# Patient Record
Sex: FEMALE | Race: WHITE | NOT HISPANIC OR LATINO
[De-identification: names, ages, dates, MRNs, and addresses within clinical notes are randomized per-mention and may not be internally consistent; named-entity substitution may affect disease eponyms.]

---

## 2017-01-09 ENCOUNTER — APPOINTMENT (OUTPATIENT)
Dept: OBGYN | Facility: CLINIC | Age: 67
End: 2017-01-09

## 2017-01-09 VITALS
HEIGHT: 65 IN | SYSTOLIC BLOOD PRESSURE: 121 MMHG | WEIGHT: 200 LBS | DIASTOLIC BLOOD PRESSURE: 70 MMHG | BODY MASS INDEX: 33.32 KG/M2

## 2017-01-09 DIAGNOSIS — M19.90 UNSPECIFIED OSTEOARTHRITIS, UNSPECIFIED SITE: ICD-10-CM

## 2017-01-09 LAB
BILIRUB UR QL STRIP: NORMAL
COLLECTION METHOD: NORMAL
GLUCOSE UR-MCNC: NORMAL
HCG UR QL: 0.2 EU/DL
HGB UR QL STRIP.AUTO: ABNORMAL
KETONES UR-MCNC: NORMAL
LEUKOCYTE ESTERASE UR QL STRIP: NORMAL
NITRITE UR QL STRIP: NORMAL
PH UR STRIP: 5.5
PROT UR STRIP-MCNC: NORMAL

## 2017-01-11 LAB — BACTERIA UR CULT: ABNORMAL

## 2017-01-16 LAB — CYTOLOGY CVX/VAG DOC THIN PREP: NORMAL

## 2017-02-22 ENCOUNTER — RX RENEWAL (OUTPATIENT)
Age: 67
End: 2017-02-22

## 2017-06-13 ENCOUNTER — RX RENEWAL (OUTPATIENT)
Age: 67
End: 2017-06-13

## 2017-11-01 ENCOUNTER — OTHER (OUTPATIENT)
Age: 67
End: 2017-11-01

## 2017-12-18 ENCOUNTER — RX RENEWAL (OUTPATIENT)
Age: 67
End: 2017-12-18

## 2018-01-10 ENCOUNTER — APPOINTMENT (OUTPATIENT)
Dept: OBGYN | Facility: CLINIC | Age: 68
End: 2018-01-10
Payer: MEDICARE

## 2018-01-10 VITALS
SYSTOLIC BLOOD PRESSURE: 132 MMHG | BODY MASS INDEX: 36.65 KG/M2 | WEIGHT: 220 LBS | HEIGHT: 65 IN | DIASTOLIC BLOOD PRESSURE: 82 MMHG

## 2018-01-10 DIAGNOSIS — Z00.00 ENCOUNTER FOR GENERAL ADULT MEDICAL EXAMINATION W/OUT ABNORMAL FINDINGS: ICD-10-CM

## 2018-01-10 LAB
BILIRUB UR QL STRIP: NORMAL
COLLECTION METHOD: NORMAL
GLUCOSE UR-MCNC: NORMAL
HCG UR QL: 0.2 EU/DL
HEMOCCULT SP1 STL QL: NEGATIVE
HGB UR QL STRIP.AUTO: ABNORMAL
KETONES UR-MCNC: NORMAL
LEUKOCYTE ESTERASE UR QL STRIP: NORMAL
NITRITE UR QL STRIP: NORMAL
PH UR STRIP: 5
PROT UR STRIP-MCNC: NORMAL
SP GR UR STRIP: 1.02

## 2018-01-10 PROCEDURE — 99214 OFFICE O/P EST MOD 30 MIN: CPT

## 2018-01-10 PROCEDURE — 81003 URINALYSIS AUTO W/O SCOPE: CPT | Mod: QW

## 2018-01-10 PROCEDURE — 82270 OCCULT BLOOD FECES: CPT

## 2018-01-15 LAB
APPEARANCE: CLEAR
BACTERIA UR CULT: NORMAL
BACTERIA: NEGATIVE
BILIRUBIN URINE: NEGATIVE
BLOOD URINE: NEGATIVE
COLOR: YELLOW
GLUCOSE QUALITATIVE U: NEGATIVE MG/DL
KETONES URINE: NEGATIVE
LEUKOCYTE ESTERASE URINE: NEGATIVE
MICROSCOPIC-UA: NORMAL
NITRITE URINE: NEGATIVE
PH URINE: 5
PROTEIN URINE: NEGATIVE MG/DL
RED BLOOD CELLS URINE: 4 /HPF
SPECIFIC GRAVITY URINE: 1.02
SQUAMOUS EPITHELIAL CELLS: 2 /HPF
UROBILINOGEN URINE: NEGATIVE MG/DL
WHITE BLOOD CELLS URINE: 1 /HPF

## 2018-01-16 ENCOUNTER — OTHER (OUTPATIENT)
Age: 68
End: 2018-01-16

## 2018-09-19 ENCOUNTER — RX RENEWAL (OUTPATIENT)
Age: 68
End: 2018-09-19

## 2018-10-15 ENCOUNTER — OTHER (OUTPATIENT)
Age: 68
End: 2018-10-15

## 2018-11-15 ENCOUNTER — APPOINTMENT (OUTPATIENT)
Dept: OBGYN | Facility: CLINIC | Age: 68
End: 2018-11-15
Payer: MEDICARE

## 2018-11-15 ENCOUNTER — ASOB RESULT (OUTPATIENT)
Age: 68
End: 2018-11-15

## 2018-11-15 PROCEDURE — 76857 US EXAM PELVIC LIMITED: CPT

## 2018-11-15 PROCEDURE — 76830 TRANSVAGINAL US NON-OB: CPT

## 2019-01-14 ENCOUNTER — APPOINTMENT (OUTPATIENT)
Dept: OBGYN | Facility: CLINIC | Age: 69
End: 2019-01-14
Payer: MEDICARE

## 2019-01-14 VITALS
HEIGHT: 65 IN | SYSTOLIC BLOOD PRESSURE: 126 MMHG | BODY MASS INDEX: 35.16 KG/M2 | WEIGHT: 211 LBS | DIASTOLIC BLOOD PRESSURE: 77 MMHG

## 2019-01-14 DIAGNOSIS — D25.9 LEIOMYOMA OF UTERUS, UNSPECIFIED: ICD-10-CM

## 2019-01-14 DIAGNOSIS — R31.29 OTHER MICROSCOPIC HEMATURIA: ICD-10-CM

## 2019-01-14 LAB
BILIRUB UR QL STRIP: NORMAL
COLLECTION METHOD: NORMAL
GLUCOSE UR-MCNC: NORMAL
HCG UR QL: 0.2 EU/DL
HEMOCCULT SP1 STL QL: NEGATIVE
HGB UR QL STRIP.AUTO: ABNORMAL
KETONES UR-MCNC: ABNORMAL
LEUKOCYTE ESTERASE UR QL STRIP: NORMAL
NITRITE UR QL STRIP: NORMAL
PH UR STRIP: 5.5
PROT UR STRIP-MCNC: NORMAL
SP GR UR STRIP: 1.02

## 2019-01-14 PROCEDURE — 81003 URINALYSIS AUTO W/O SCOPE: CPT | Mod: QW

## 2019-01-14 PROCEDURE — 82270 OCCULT BLOOD FECES: CPT

## 2019-01-14 PROCEDURE — G0101: CPT

## 2019-01-14 NOTE — PHYSICAL EXAM
[Awake] : awake [Alert] : alert [Soft] : soft [Obese] : obese [Oriented x3] : oriented to person, place, and time [Normal] : uterus [No Bleeding] : there was no active vaginal bleeding [Uterine Adnexae] : were not tender and not enlarged [Nl Sphincter Tone] : normal sphincter tone [External Hemorrhoid] : an external hemorrhoid [Acute Distress] : no acute distress [Mass] : no breast mass [Nipple Discharge] : no nipple discharge [Axillary LAD] : no axillary lymphadenopathy [Tender] : non tender [FreeTextEntry9] : GUAIAC DEFERRED, HAD COLONOSCOPY IN DECEMBER 2016

## 2019-01-14 NOTE — HISTORY OF PRESENT ILLNESS
[1 Year Ago] : 1 year ago [Fair] : being in fair health [Regular Exercise] : regular exercise [Weight Concerns] : weight concens [Overweight] : overweight [Recent Weight Loss (___ Lbs)] : recent [unfilled] ~Ulbs weight loss [Last Mammogram ___] : Last Mammogram was [unfilled] [Last Bone Density ___] : Last bone density studies [unfilled] [Last Colonoscopy ___] : Last colonoscopy [unfilled] [Last Pap ___] : Last cervical pap smear was [unfilled] [Postmenopausal] : is postmenopausal [Definite:  ___ (Date)] : the last menstrual period was [unfilled] [Currently In Menopause] : currently in menopause [Healthy Diet] : not having a healthy diet [de-identified] :  EXERCISES WHILE IN [place], GOING BACK IN 2 WEEKS [FreeTextEntry1] : NEW MEDICATION "SAXENDA" THAT CUTS APPETITE [Hot Flashes] : no hot flashes [Night Sweats] : no night sweats [FreeTextEntry8] : NO SYMPTOMS ON HT, GETS MIGRAINES AND INSOMNIA WHEN OFF HT [Experiencing Menopausal Sxs] : not experiencing menopausal symptoms [Sexually Active] : is not sexually active

## 2019-01-17 LAB
APPEARANCE: CLEAR
BACTERIA UR CULT: ABNORMAL
BACTERIA: NEGATIVE
BILIRUBIN URINE: NEGATIVE
BLOOD URINE: NEGATIVE
COLOR: YELLOW
GLUCOSE QUALITATIVE U: NEGATIVE MG/DL
HYALINE CASTS: 6 /LPF
KETONES URINE: NEGATIVE
LEUKOCYTE ESTERASE URINE: NEGATIVE
MICROSCOPIC-UA: NORMAL
NITRITE URINE: NEGATIVE
PH URINE: 6
PROTEIN URINE: NEGATIVE MG/DL
RED BLOOD CELLS URINE: 6 /HPF
SPECIFIC GRAVITY URINE: 1.02
SQUAMOUS EPITHELIAL CELLS: 4 /HPF
UROBILINOGEN URINE: NEGATIVE MG/DL
WHITE BLOOD CELLS URINE: 2 /HPF

## 2019-02-12 ENCOUNTER — APPOINTMENT (OUTPATIENT)
Dept: OBGYN | Facility: CLINIC | Age: 69
End: 2019-02-12
Payer: MEDICARE

## 2019-02-12 PROCEDURE — 77085 DXA BONE DENSITY AXL VRT FX: CPT

## 2019-02-19 ENCOUNTER — OTHER (OUTPATIENT)
Age: 69
End: 2019-02-19

## 2019-06-03 ENCOUNTER — APPOINTMENT (OUTPATIENT)
Dept: OBGYN | Facility: CLINIC | Age: 69
End: 2019-06-03
Payer: MEDICARE

## 2019-06-03 ENCOUNTER — ASOB RESULT (OUTPATIENT)
Age: 69
End: 2019-06-03

## 2019-06-03 PROCEDURE — 76830 TRANSVAGINAL US NON-OB: CPT

## 2019-08-15 ENCOUNTER — ASOB RESULT (OUTPATIENT)
Age: 69
End: 2019-08-15

## 2019-08-15 ENCOUNTER — APPOINTMENT (OUTPATIENT)
Dept: OBGYN | Facility: CLINIC | Age: 69
End: 2019-08-15
Payer: MEDICARE

## 2019-08-15 DIAGNOSIS — N84.0 POLYP OF CORPUS UTERI: ICD-10-CM

## 2019-08-15 PROCEDURE — 76831 ECHO EXAM UTERUS: CPT

## 2019-08-15 PROCEDURE — 58100 BIOPSY OF UTERUS LINING: CPT

## 2019-08-15 PROCEDURE — 58340 CATHETER FOR HYSTEROGRAPHY: CPT

## 2019-08-15 NOTE — PROCEDURE
[Endometrial Biopsy] : Endometrial biopsy [Post-Menop. Bleeding] : post-menopausal bleeding [Risks] : risks [Benefits] : benefits [Alternatives] : alternatives [Patient] : patient [Infection] : infection [Bleeding] : bleeding [Allergic Reaction] : allergic reaction [Pain] : pain [CONSENT OBTAINED] : written consent was obtained prior to the procedure. [LMP ___] : LMP was [unfilled] [No Premedication] : No premedication [None] : none [Betadine] : Betadine [Easy Passage] : allowed easy passage of a uterine sound without dilation [Sounded to ___ cm] : sounded to [unfilled] ~Ucm [Moderate] : a moderate [Pipelle] : a Pipelle endometrial suction curette [Sent to Histology] : the specimen was place in buffered formalin and sent for pathlogy [Tolerated Well] : the patient tolerated the procedure well [No Complications] : there were no complications [de-identified] : FOLLOWING SIS, ENDOMETRIAL BIOPSY WAS PERFORMED

## 2019-08-15 NOTE — ASSESSMENT
[FreeTextEntry1] : Risks and benefits of surgery were discussed with patient extensively, including but not limited to bleeding, infection and uterine perforation.  The patient expressed her understanding, her questions were answered and consent obtained.\par FOR OPERATIVE HYSTEROSCOPY, POLYPECTOMY, D&C OF UTERUS.

## 2019-08-21 LAB — CORE LAB BIOPSY: NORMAL

## 2019-09-19 ENCOUNTER — APPOINTMENT (OUTPATIENT)
Dept: OBGYN | Facility: CLINIC | Age: 69
End: 2019-09-19

## 2019-10-07 ENCOUNTER — OUTPATIENT (OUTPATIENT)
Dept: OUTPATIENT SERVICES | Facility: HOSPITAL | Age: 69
LOS: 1 days | End: 2019-10-07
Payer: MEDICARE

## 2019-10-07 VITALS
HEIGHT: 65 IN | SYSTOLIC BLOOD PRESSURE: 124 MMHG | OXYGEN SATURATION: 99 % | DIASTOLIC BLOOD PRESSURE: 68 MMHG | HEART RATE: 70 BPM | TEMPERATURE: 98 F | RESPIRATION RATE: 16 BRPM | WEIGHT: 186.07 LBS

## 2019-10-07 DIAGNOSIS — Z01.818 ENCOUNTER FOR OTHER PREPROCEDURAL EXAMINATION: ICD-10-CM

## 2019-10-07 DIAGNOSIS — N95.0 POSTMENOPAUSAL BLEEDING: ICD-10-CM

## 2019-10-07 DIAGNOSIS — Z92.241 PERSONAL HISTORY OF SYSTEMIC STEROID THERAPY: Chronic | ICD-10-CM

## 2019-10-07 DIAGNOSIS — Z98.890 OTHER SPECIFIED POSTPROCEDURAL STATES: Chronic | ICD-10-CM

## 2019-10-07 DIAGNOSIS — Z98.49 CATARACT EXTRACTION STATUS, UNSPECIFIED EYE: Chronic | ICD-10-CM

## 2019-10-07 DIAGNOSIS — Z96.612 PRESENCE OF LEFT ARTIFICIAL SHOULDER JOINT: Chronic | ICD-10-CM

## 2019-10-07 DIAGNOSIS — Z96.651 PRESENCE OF RIGHT ARTIFICIAL KNEE JOINT: Chronic | ICD-10-CM

## 2019-10-07 LAB
ANION GAP SERPL CALC-SCNC: 6 MMOL/L — SIGNIFICANT CHANGE UP (ref 5–17)
BASOPHILS # BLD AUTO: 0.07 K/UL — SIGNIFICANT CHANGE UP (ref 0–0.2)
BASOPHILS NFR BLD AUTO: 0.9 % — SIGNIFICANT CHANGE UP (ref 0–2)
BUN SERPL-MCNC: 18 MG/DL — SIGNIFICANT CHANGE UP (ref 7–23)
CALCIUM SERPL-MCNC: 9.4 MG/DL — SIGNIFICANT CHANGE UP (ref 8.5–10.1)
CHLORIDE SERPL-SCNC: 105 MMOL/L — SIGNIFICANT CHANGE UP (ref 96–108)
CO2 SERPL-SCNC: 29 MMOL/L — SIGNIFICANT CHANGE UP (ref 22–31)
CREAT SERPL-MCNC: 0.84 MG/DL — SIGNIFICANT CHANGE UP (ref 0.5–1.3)
EOSINOPHIL # BLD AUTO: 0.2 K/UL — SIGNIFICANT CHANGE UP (ref 0–0.5)
EOSINOPHIL NFR BLD AUTO: 2.5 % — SIGNIFICANT CHANGE UP (ref 0–6)
GLUCOSE SERPL-MCNC: 83 MG/DL — SIGNIFICANT CHANGE UP (ref 70–99)
HCT VFR BLD CALC: 38.6 % — SIGNIFICANT CHANGE UP (ref 34.5–45)
HCV AB S/CO SERPL IA: 0.12 S/CO — SIGNIFICANT CHANGE UP (ref 0–0.99)
HCV AB SERPL-IMP: SIGNIFICANT CHANGE UP
HGB BLD-MCNC: 12.3 G/DL — SIGNIFICANT CHANGE UP (ref 11.5–15.5)
IMM GRANULOCYTES NFR BLD AUTO: 0.4 % — SIGNIFICANT CHANGE UP (ref 0–1.5)
LYMPHOCYTES # BLD AUTO: 2.16 K/UL — SIGNIFICANT CHANGE UP (ref 1–3.3)
LYMPHOCYTES # BLD AUTO: 26.9 % — SIGNIFICANT CHANGE UP (ref 13–44)
MCHC RBC-ENTMCNC: 27.6 PG — SIGNIFICANT CHANGE UP (ref 27–34)
MCHC RBC-ENTMCNC: 31.9 GM/DL — LOW (ref 32–36)
MCV RBC AUTO: 86.5 FL — SIGNIFICANT CHANGE UP (ref 80–100)
MONOCYTES # BLD AUTO: 0.71 K/UL — SIGNIFICANT CHANGE UP (ref 0–0.9)
MONOCYTES NFR BLD AUTO: 8.8 % — SIGNIFICANT CHANGE UP (ref 2–14)
NEUTROPHILS # BLD AUTO: 4.87 K/UL — SIGNIFICANT CHANGE UP (ref 1.8–7.4)
NEUTROPHILS NFR BLD AUTO: 60.5 % — SIGNIFICANT CHANGE UP (ref 43–77)
PLATELET # BLD AUTO: 294 K/UL — SIGNIFICANT CHANGE UP (ref 150–400)
POTASSIUM SERPL-MCNC: 3.9 MMOL/L — SIGNIFICANT CHANGE UP (ref 3.5–5.3)
POTASSIUM SERPL-SCNC: 3.9 MMOL/L — SIGNIFICANT CHANGE UP (ref 3.5–5.3)
RBC # BLD: 4.46 M/UL — SIGNIFICANT CHANGE UP (ref 3.8–5.2)
RBC # FLD: 12.7 % — SIGNIFICANT CHANGE UP (ref 10.3–14.5)
SODIUM SERPL-SCNC: 140 MMOL/L — SIGNIFICANT CHANGE UP (ref 135–145)
WBC # BLD: 8.04 K/UL — SIGNIFICANT CHANGE UP (ref 3.8–10.5)
WBC # FLD AUTO: 8.04 K/UL — SIGNIFICANT CHANGE UP (ref 3.8–10.5)

## 2019-10-07 PROCEDURE — 93010 ELECTROCARDIOGRAM REPORT: CPT

## 2019-10-07 PROCEDURE — 86850 RBC ANTIBODY SCREEN: CPT

## 2019-10-07 PROCEDURE — 85025 COMPLETE CBC W/AUTO DIFF WBC: CPT

## 2019-10-07 PROCEDURE — 86900 BLOOD TYPING SEROLOGIC ABO: CPT

## 2019-10-07 PROCEDURE — G0463: CPT | Mod: 25

## 2019-10-07 PROCEDURE — 36415 COLL VENOUS BLD VENIPUNCTURE: CPT

## 2019-10-07 PROCEDURE — 93005 ELECTROCARDIOGRAM TRACING: CPT

## 2019-10-07 PROCEDURE — 86901 BLOOD TYPING SEROLOGIC RH(D): CPT

## 2019-10-07 PROCEDURE — 86803 HEPATITIS C AB TEST: CPT

## 2019-10-07 PROCEDURE — 80048 BASIC METABOLIC PNL TOTAL CA: CPT

## 2019-10-07 NOTE — H&P PST ADULT - NSICDXPASTSURGICALHX_GEN_ALL_CORE_FT
PAST SURGICAL HISTORY:  H/O hand surgery with implants;  multiple    H/O total shoulder replacement, left 1/2017    S/P carpal tunnel release bilateral    S/P cataract surgery bilateral IOL    S/P epidural steroid injection     S/P TKR (total knee replacement), right 3/2018

## 2019-10-07 NOTE — H&P PST ADULT - NSICDXFAMILYHX_GEN_ALL_CORE_FT
FAMILY HISTORY:  Family history of Hodgkin's lymphoma, sister  FH: bladder cancer, mother  FH: hypertension, mother  FH: lung cancer, father

## 2019-10-07 NOTE — H&P PST ADULT - ASSESSMENT
69 year old female presents to PST for planned D&C hysteroscopy and polypectomy    Plan:  1. PST instructions given ; NPO post midnight   2. Medical Optimization  with Dr Pedro Frye   3. Stop NSAIDS ( Aspirin Alev Motrin Mobic Diclofenac), herbal supplements , MVI , Vitamin fish oil 7 days prior to surgery  unless directed by surgeon or cardiologist;   4. Labs EKG CXR as per surgeon request

## 2019-10-07 NOTE — H&P PST ADULT - HISTORY OF PRESENT ILLNESS
69 year old female with post menopausal bleeding and polyp; Pt said she had bleeding episode last week of September; denies bloating, abdominal pain cramping and change in bowel habits ; she presents to PST for planned D&C hysteroscopy and polypectomy

## 2019-10-07 NOTE — H&P PST ADULT - NSICDXPASTMEDICALHX_GEN_ALL_CORE_FT
PAST MEDICAL HISTORY:  Depression     GERD (gastroesophageal reflux disease)     Herpes zoster     Hypertension     Lumbar herniated disc     OA (osteoarthritis)     VAUGHN (obstructive sleep apnea) pt says she feels better after she lost 40 lbs ; unable to tolerate cpap and not using dental appliance    Spinal stenosis cervical and lumbar PAST MEDICAL HISTORY:  Depression     GERD (gastroesophageal reflux disease)     Herpes zoster     Hypertension     Lumbar herniated disc     OA (osteoarthritis)     VAUGHN (obstructive sleep apnea) pt says she feels better after she lost 40 lbs ; unable to tolerate cpap and not using dental appliance    Spinal stenosis cervical and lumbar    Varicose veins lower extremi

## 2019-10-08 DIAGNOSIS — N95.0 POSTMENOPAUSAL BLEEDING: ICD-10-CM

## 2019-10-08 DIAGNOSIS — Z01.818 ENCOUNTER FOR OTHER PREPROCEDURAL EXAMINATION: ICD-10-CM

## 2019-10-22 RX ORDER — ONDANSETRON 8 MG/1
4 TABLET, FILM COATED ORAL ONCE
Refills: 0 | Status: DISCONTINUED | OUTPATIENT
Start: 2019-10-23 | End: 2019-10-23

## 2019-10-22 RX ORDER — FENTANYL CITRATE 50 UG/ML
50 INJECTION INTRAVENOUS
Refills: 0 | Status: DISCONTINUED | OUTPATIENT
Start: 2019-10-23 | End: 2019-10-23

## 2019-10-22 RX ORDER — SODIUM CHLORIDE 9 MG/ML
1000 INJECTION, SOLUTION INTRAVENOUS
Refills: 0 | Status: DISCONTINUED | OUTPATIENT
Start: 2019-10-23 | End: 2019-10-23

## 2019-10-22 RX ORDER — SODIUM CHLORIDE 9 MG/ML
3 INJECTION INTRAMUSCULAR; INTRAVENOUS; SUBCUTANEOUS EVERY 8 HOURS
Refills: 0 | Status: DISCONTINUED | OUTPATIENT
Start: 2019-10-23 | End: 2019-10-23

## 2019-10-23 ENCOUNTER — OUTPATIENT (OUTPATIENT)
Dept: INPATIENT UNIT | Facility: HOSPITAL | Age: 69
LOS: 1 days | Discharge: HOME | End: 2019-10-23
Payer: MEDICARE

## 2019-10-23 ENCOUNTER — RESULT REVIEW (OUTPATIENT)
Age: 69
End: 2019-10-23

## 2019-10-23 VITALS
TEMPERATURE: 98 F | HEART RATE: 81 BPM | SYSTOLIC BLOOD PRESSURE: 111 MMHG | RESPIRATION RATE: 16 BRPM | WEIGHT: 186.07 LBS | OXYGEN SATURATION: 98 % | DIASTOLIC BLOOD PRESSURE: 66 MMHG | HEIGHT: 65 IN

## 2019-10-23 VITALS
OXYGEN SATURATION: 98 % | RESPIRATION RATE: 18 BRPM | HEART RATE: 89 BPM | SYSTOLIC BLOOD PRESSURE: 114 MMHG | DIASTOLIC BLOOD PRESSURE: 59 MMHG | TEMPERATURE: 98 F

## 2019-10-23 DIAGNOSIS — Z96.612 PRESENCE OF LEFT ARTIFICIAL SHOULDER JOINT: Chronic | ICD-10-CM

## 2019-10-23 DIAGNOSIS — Z98.890 OTHER SPECIFIED POSTPROCEDURAL STATES: Chronic | ICD-10-CM

## 2019-10-23 DIAGNOSIS — Z96.651 PRESENCE OF RIGHT ARTIFICIAL KNEE JOINT: Chronic | ICD-10-CM

## 2019-10-23 DIAGNOSIS — Z98.49 CATARACT EXTRACTION STATUS, UNSPECIFIED EYE: Chronic | ICD-10-CM

## 2019-10-23 DIAGNOSIS — N84.0 POLYP OF CORPUS UTERI: ICD-10-CM

## 2019-10-23 DIAGNOSIS — Z92.241 PERSONAL HISTORY OF SYSTEMIC STEROID THERAPY: Chronic | ICD-10-CM

## 2019-10-23 DIAGNOSIS — N95.0 POSTMENOPAUSAL BLEEDING: ICD-10-CM

## 2019-10-23 PROCEDURE — 88305 TISSUE EXAM BY PATHOLOGIST: CPT

## 2019-10-23 PROCEDURE — 58561 HYSTEROSCOPY REMOVE MYOMA: CPT

## 2019-10-23 PROCEDURE — 88305 TISSUE EXAM BY PATHOLOGIST: CPT | Mod: 26

## 2019-10-23 RX ORDER — OXYCODONE HYDROCHLORIDE 5 MG/1
5 TABLET ORAL ONCE
Refills: 0 | Status: DISCONTINUED | OUTPATIENT
Start: 2019-10-23 | End: 2019-10-23

## 2019-10-23 RX ORDER — FAMOTIDINE 10 MG/ML
20 INJECTION INTRAVENOUS ONCE
Refills: 0 | Status: COMPLETED | OUTPATIENT
Start: 2019-10-23 | End: 2019-10-23

## 2019-10-23 RX ORDER — ACETAMINOPHEN 500 MG
975 TABLET ORAL ONCE
Refills: 0 | Status: COMPLETED | OUTPATIENT
Start: 2019-10-23 | End: 2019-10-23

## 2019-10-23 RX ADMIN — FENTANYL CITRATE 50 MICROGRAM(S): 50 INJECTION INTRAVENOUS at 11:30

## 2019-10-23 RX ADMIN — FENTANYL CITRATE 50 MICROGRAM(S): 50 INJECTION INTRAVENOUS at 11:18

## 2019-10-23 RX ADMIN — FENTANYL CITRATE 50 MICROGRAM(S): 50 INJECTION INTRAVENOUS at 11:45

## 2019-10-23 RX ADMIN — OXYCODONE HYDROCHLORIDE 5 MILLIGRAM(S): 5 TABLET ORAL at 11:18

## 2019-10-23 RX ADMIN — OXYCODONE HYDROCHLORIDE 5 MILLIGRAM(S): 5 TABLET ORAL at 11:45

## 2019-10-23 RX ADMIN — Medication 975 MILLIGRAM(S): at 09:48

## 2019-10-23 RX ADMIN — Medication 975 MILLIGRAM(S): at 09:49

## 2019-10-23 RX ADMIN — FAMOTIDINE 20 MILLIGRAM(S): 10 INJECTION INTRAVENOUS at 09:48

## 2019-10-23 NOTE — ASU PATIENT PROFILE, ADULT - PMH
Depression    GERD (gastroesophageal reflux disease)    Herpes zoster    Hypertension    Lumbar herniated disc    OA (osteoarthritis)    VAUGHN (obstructive sleep apnea)  pt says she feels better after she lost 40 lbs ; unable to tolerate cpap and not using dental appliance  Spinal stenosis  cervical and lumbar  Varicose veins  lower extremi

## 2019-10-23 NOTE — ASU PATIENT PROFILE, ADULT - PSH
H/O hand surgery  with implants;  multiple  H/O total shoulder replacement, left  1/2017  S/P carpal tunnel release  bilateral  S/P cataract surgery  bilateral IOL  S/P epidural steroid injection    S/P TKR (total knee replacement), right  3/2018

## 2019-10-23 NOTE — ASU DISCHARGE PLAN (ADULT/PEDIATRIC) - CARE PROVIDER_API CALL
Coy Grijalva)  Obstetrics and Gynecology  [address] [id number]  Phone: [phone]  Fax: [phone]  Follow Up Time:

## 2019-10-23 NOTE — BRIEF OPERATIVE NOTE - OPERATION/FINDINGS
Stenotic cervix, normal sized uterus with prominent left uterine horn.  Small polyps at posterior uterine wall.  Large posterior lesion in left horn, resected using myosure resecting hysteroscope, polyp vs. myoma, pending pathology.  No separate endometrial curetting performed due to uterine shape.  NS used 3000 cc, 500 cc deficit.

## 2019-10-23 NOTE — ASU DISCHARGE PLAN (ADULT/PEDIATRIC) - CALL YOUR DOCTOR IF YOU HAVE ANY OF THE FOLLOWING:
Unable to urinate/Inability to tolerate liquids or foods/Increased irritability or sluggishness/Nausea and vomiting that does not stop

## 2019-10-23 NOTE — BRIEF OPERATIVE NOTE - NSICDXBRIEFPREOP_GEN_ALL_CORE_FT
PRE-OP DIAGNOSIS:  Uterine polyp 23-Oct-2019 11:16:50  Coy Grijalva  Postmenopausal bleeding 23-Oct-2019 11:16:39  Coy Grijalva

## 2019-10-30 DIAGNOSIS — M51.26 OTHER INTERVERTEBRAL DISC DISPLACEMENT, LUMBAR REGION: ICD-10-CM

## 2019-10-30 DIAGNOSIS — N88.2 STRICTURE AND STENOSIS OF CERVIX UTERI: ICD-10-CM

## 2019-10-30 DIAGNOSIS — N84.0 POLYP OF CORPUS UTERI: ICD-10-CM

## 2019-10-30 DIAGNOSIS — M19.90 UNSPECIFIED OSTEOARTHRITIS, UNSPECIFIED SITE: ICD-10-CM

## 2019-10-30 DIAGNOSIS — N95.0 POSTMENOPAUSAL BLEEDING: ICD-10-CM

## 2019-10-30 DIAGNOSIS — Z96.651 PRESENCE OF RIGHT ARTIFICIAL KNEE JOINT: ICD-10-CM

## 2019-10-30 DIAGNOSIS — K21.9 GASTRO-ESOPHAGEAL REFLUX DISEASE WITHOUT ESOPHAGITIS: ICD-10-CM

## 2019-10-30 DIAGNOSIS — Z96.612 PRESENCE OF LEFT ARTIFICIAL SHOULDER JOINT: ICD-10-CM

## 2019-10-30 DIAGNOSIS — M81.0 AGE-RELATED OSTEOPOROSIS WITHOUT CURRENT PATHOLOGICAL FRACTURE: ICD-10-CM

## 2019-10-30 DIAGNOSIS — E78.5 HYPERLIPIDEMIA, UNSPECIFIED: ICD-10-CM

## 2019-10-30 DIAGNOSIS — F32.9 MAJOR DEPRESSIVE DISORDER, SINGLE EPISODE, UNSPECIFIED: ICD-10-CM

## 2019-10-30 DIAGNOSIS — I10 ESSENTIAL (PRIMARY) HYPERTENSION: ICD-10-CM

## 2019-10-30 DIAGNOSIS — G47.33 OBSTRUCTIVE SLEEP APNEA (ADULT) (PEDIATRIC): ICD-10-CM

## 2019-11-06 ENCOUNTER — APPOINTMENT (OUTPATIENT)
Dept: OBGYN | Facility: CLINIC | Age: 69
End: 2019-11-06
Payer: MEDICARE

## 2019-11-06 VITALS
WEIGHT: 211 LBS | HEIGHT: 65 IN | BODY MASS INDEX: 35.16 KG/M2 | DIASTOLIC BLOOD PRESSURE: 66 MMHG | SYSTOLIC BLOOD PRESSURE: 122 MMHG

## 2019-11-06 DIAGNOSIS — N95.0 POSTMENOPAUSAL BLEEDING: ICD-10-CM

## 2019-11-06 PROCEDURE — 99212 OFFICE O/P EST SF 10 MIN: CPT

## 2019-11-06 NOTE — HISTORY OF PRESENT ILLNESS
[0/10] : no pain reported [Normal] : the vagina was normal [Doing Well] : is doing well [Excellent Pain Control] : has excellent pain control [No Sign of Infection] : is showing no signs of infection [None] : None [Fever] : no fever [Chills] : no chills [Nausea] : no nausea [Vomiting] : no vomiting [Vaginal Bleeding] : no vaginal bleeding [de-identified] : PATHOLOGY REPORT REVIEWED, MYOMA RESECTED, NORMAL ENDOMETRIUM, PROMINENT LEFT UTERINE HORN [de-identified] : ABDOMEN SOFT, NT, NO CMT, FUNDUS NT. [de-identified] : S/P RESECTION OF MYOMA [de-identified] : DUE FOR ANNUAL 1/2020

## 2019-11-07 PROBLEM — I10 ESSENTIAL (PRIMARY) HYPERTENSION: Chronic | Status: ACTIVE | Noted: 2019-10-07

## 2019-11-07 PROBLEM — M51.26 OTHER INTERVERTEBRAL DISC DISPLACEMENT, LUMBAR REGION: Chronic | Status: ACTIVE | Noted: 2019-10-07

## 2019-11-07 PROBLEM — I83.90 ASYMPTOMATIC VARICOSE VEINS OF UNSPECIFIED LOWER EXTREMITY: Chronic | Status: ACTIVE | Noted: 2019-10-07

## 2019-11-07 PROBLEM — F32.9 MAJOR DEPRESSIVE DISORDER, SINGLE EPISODE, UNSPECIFIED: Chronic | Status: ACTIVE | Noted: 2019-10-07

## 2020-01-29 ENCOUNTER — APPOINTMENT (OUTPATIENT)
Dept: OBGYN | Facility: CLINIC | Age: 70
End: 2020-01-29
Payer: MEDICARE

## 2020-01-29 VITALS
HEIGHT: 65 IN | BODY MASS INDEX: 30.32 KG/M2 | SYSTOLIC BLOOD PRESSURE: 130 MMHG | WEIGHT: 182 LBS | DIASTOLIC BLOOD PRESSURE: 82 MMHG

## 2020-01-29 DIAGNOSIS — Z01.419 ENCOUNTER FOR GYNECOLOGICAL EXAMINATION (GENERAL) (ROUTINE) W/OUT ABNORMAL FINDINGS: ICD-10-CM

## 2020-01-29 LAB
BILIRUB UR QL STRIP: NORMAL
COLLECTION METHOD: NORMAL
GLUCOSE UR-MCNC: NORMAL
HCG UR QL: 0.2 EU/DL
HGB UR QL STRIP.AUTO: NORMAL
KETONES UR-MCNC: NORMAL
LEUKOCYTE ESTERASE UR QL STRIP: NORMAL
NITRITE UR QL STRIP: NORMAL
PH UR STRIP: 6
PROT UR STRIP-MCNC: NORMAL
SP GR UR STRIP: 1.03

## 2020-01-29 PROCEDURE — 99214 OFFICE O/P EST MOD 30 MIN: CPT

## 2020-01-29 PROCEDURE — 81003 URINALYSIS AUTO W/O SCOPE: CPT | Mod: QW

## 2020-01-29 RX ORDER — SULFAMETHOXAZOLE AND TRIMETHOPRIM 800; 160 MG/1; MG/1
800-160 TABLET ORAL TWICE DAILY
Qty: 10 | Refills: 0 | Status: DISCONTINUED | COMMUNITY
Start: 2019-01-18 | End: 2020-01-29

## 2020-01-29 RX ORDER — LOSARTAN POTASSIUM 100 MG/1
TABLET, FILM COATED ORAL
Refills: 0 | Status: ACTIVE | COMMUNITY

## 2020-01-29 RX ORDER — NORETHINDRONE ACETATE AND ETHINYL ESTRADIOL .005; 1 MG/1; MG/1
1-5 TABLET, FILM COATED ORAL
Qty: 84 | Refills: 2 | Status: DISCONTINUED | COMMUNITY
Start: 2019-02-15 | End: 2020-01-29

## 2020-01-29 RX ORDER — DICLOFENAC SODIUM 100 MG/1
TABLET, FILM COATED, EXTENDED RELEASE ORAL
Refills: 0 | Status: DISCONTINUED | COMMUNITY
End: 2020-01-29

## 2020-01-29 RX ORDER — CELECOXIB 50 MG/1
CAPSULE ORAL
Refills: 0 | Status: ACTIVE | COMMUNITY

## 2020-01-29 RX ORDER — DULOXETINE HYDROCHLORIDE 60 MG/1
60 CAPSULE, DELAYED RELEASE ORAL
Refills: 0 | Status: ACTIVE | COMMUNITY

## 2020-01-29 NOTE — PHYSICAL EXAM
[Alert] : alert [Awake] : awake [Soft] : soft [Obese] : obese [Oriented x3] : oriented to person, place, and time [Normal] : cervix [No Bleeding] : there was no active vaginal bleeding [Uterine Adnexae] : were not tender and not enlarged [Mass] : no breast mass [Acute Distress] : no acute distress [Nipple Discharge] : no nipple discharge [Tender] : non tender [Axillary LAD] : no axillary lymphadenopathy [FreeTextEntry9] : GUAIAC DEFERRED, HAD COLONOSCOPY IN DECEMBER 2019 [Exam Deferred] : was deferred

## 2020-01-29 NOTE — HISTORY OF PRESENT ILLNESS
[1 Year Ago] : 1 year ago [Weight Concerns] : weight concens [Overweight] : overweight [Last Mammogram ___] : Last Mammogram was [unfilled] [Last Bone Density ___] : Last bone density studies [unfilled] [Last Pap ___] : Last cervical pap smear was [unfilled] [Postmenopausal] : is postmenopausal [Definite:  ___ (Date)] : the last menstrual period was [unfilled] [Currently In Menopause] : currently in menopause [Good] : being in good health [Recent Weight Loss (___ Lbs)] : recent [unfilled] ~Ulbs weight loss [Regular Exercise] : not exercising regularly [Healthy Diet] : not having a healthy diet [de-identified] : weight loss on Saxenda, 42 pounds [Last Colonoscopy ___] : Last colonoscopy [unfilled] [FreeTextEntry1] :  "SAXENDA" THAT CUTS APPETITE [Hot Flashes] : no hot flashes [FreeTextEntry8] : NO SYMPTOMS ON HT, GETS MIGRAINES AND INSOMNIA WHEN OFF HT [Night Sweats] : no night sweats [Experiencing Menopausal Sxs] : not experiencing menopausal symptoms [Sexually Active] : is not sexually active

## 2020-06-12 DIAGNOSIS — Z01.818 ENCOUNTER FOR OTHER PREPROCEDURAL EXAMINATION: ICD-10-CM

## 2020-06-13 ENCOUNTER — APPOINTMENT (OUTPATIENT)
Dept: DISASTER EMERGENCY | Facility: CLINIC | Age: 70
End: 2020-06-13

## 2020-06-13 LAB — SARS-COV-2 N GENE NPH QL NAA+PROBE: NOT DETECTED

## 2020-12-21 ENCOUNTER — RX RENEWAL (OUTPATIENT)
Age: 70
End: 2020-12-21

## 2020-12-23 PROBLEM — Z01.419 ENCOUNTER FOR GYNECOLOGICAL EXAMINATION WITHOUT ABNORMAL FINDING: Status: RESOLVED | Noted: 2017-01-09 | Resolved: 2020-12-23

## 2021-03-11 ENCOUNTER — APPOINTMENT (OUTPATIENT)
Dept: OBGYN | Facility: CLINIC | Age: 71
End: 2021-03-11
Payer: MEDICARE

## 2021-03-11 VITALS
DIASTOLIC BLOOD PRESSURE: 70 MMHG | TEMPERATURE: 97.3 F | WEIGHT: 195 LBS | SYSTOLIC BLOOD PRESSURE: 130 MMHG | HEIGHT: 65 IN | BODY MASS INDEX: 32.49 KG/M2

## 2021-03-11 DIAGNOSIS — Z01.419 ENCOUNTER FOR GYNECOLOGICAL EXAMINATION (GENERAL) (ROUTINE) W/OUT ABNORMAL FINDINGS: ICD-10-CM

## 2021-03-11 LAB — HEMOCCULT SP1 STL QL: NEGATIVE

## 2021-03-11 PROCEDURE — G0101: CPT

## 2021-03-11 PROCEDURE — 82270 OCCULT BLOOD FECES: CPT

## 2021-03-11 RX ORDER — NORETHINDRONE ACETATE AND ETHINYL ESTRADIOL .5; .0025 MG/1; MG/1
0.5-2.5 TABLET ORAL DAILY
Qty: 84 | Refills: 3 | Status: ACTIVE | COMMUNITY
Start: 2020-12-21 | End: 1900-01-01

## 2021-03-11 NOTE — COUNSELING
[Nutrition/ Exercise/ Weight Management] : nutrition, exercise, weight management [Breast Self Exam] : breast self exam [FreeTextEntry2] : bone health maintenance

## 2021-03-11 NOTE — REVIEW OF SYSTEMS
[Patient Intake Form Reviewed] : Patient intake form was reviewed [Negative] : Heme/Lymph [Dizziness] : dizziness [FreeTextEntry9] : joint pain [de-identified] : sweats

## 2021-03-11 NOTE — HISTORY OF PRESENT ILLNESS
[Patient reported bone density results were normal] : Patient reported bone density results were normal [postmenopausal] : postmenopausal [Patient reported mammogram was normal] : Patient reported mammogram was normal [Patient reported PAP Smear was normal] : Patient reported PAP Smear was normal [Patient reported colonoscopy was normal] : Patient reported colonoscopy was normal [TextBox_4] :  72 YO FEMALE,HERE FOR ANNUAL EXAM  Pt waiting to be vaccinated   had first vaccine\par HER LAST ANNUAL EXAM WAS:1/29/20\par \par PREGNANCY HISTORY:  TOTAL 1     LIVE BIRTHS:   1   SAB:      IAB:\par \par SEXUALLY ACTIVE: no\par LENGTH OF TIME IN RELATIONSHIP: \par \par MEDICAL HISTORY INCLUDES:  htn, OA, bone loss   10/2019 D&C benign stroma\par FAMILY HISTORY IS SIGNIFICANT FOR: breast cancer CVD\par \par LAST VISIT WITH PRIMARY DOCTOR: Feb 2021\par LAST BLOOD WORK:Feb 2021\par \par NUTRITIONAL INFO: Healthy eater, sees weight loss doctor   2 daily\par CALCIUM INTAKE:SUPPLEMENTS:\par CORRECT USE OF CALCIUM SUPPLEMENTS WAS REVIEWED\par \par EXERCISES: severe arthritis through body  \par  [Mammogramdate] : 12/9/19 [PapSmeardate] : 1/9/17 [BoneDensityDate] : 2/12/19 [TextBox_37] : loss at spine  [ColonoscopyDate] : 2020 [LMPDate] : 2007

## 2021-03-11 NOTE — PLAN
[FreeTextEntry1] : Pt  has fu with primary for blood and urine  in 3 months, and recent ua neg   Defer ua today\par Has no uti symptoms

## 2021-03-18 LAB — CYTOLOGY CVX/VAG DOC THIN PREP: NORMAL

## 2021-04-28 ENCOUNTER — APPOINTMENT (OUTPATIENT)
Dept: PULMONOLOGY | Facility: CLINIC | Age: 71
End: 2021-04-28
Payer: MEDICARE

## 2021-04-28 VITALS
SYSTOLIC BLOOD PRESSURE: 148 MMHG | DIASTOLIC BLOOD PRESSURE: 92 MMHG | HEART RATE: 91 BPM | OXYGEN SATURATION: 97 % | WEIGHT: 188 LBS | TEMPERATURE: 97.8 F | RESPIRATION RATE: 16 BRPM | BODY MASS INDEX: 31.32 KG/M2 | HEIGHT: 65 IN

## 2021-04-28 PROCEDURE — 99204 OFFICE O/P NEW MOD 45 MIN: CPT | Mod: GC

## 2021-04-29 NOTE — REVIEW OF SYSTEMS
[EDS: ESS=____] : daytime somnolence: ESS=[unfilled] [Recent Wt Loss (___ Lbs)] : recent [unfilled] ~Ulb weight loss [Nasal Congestion] : nasal congestion [A.M. Dry Mouth] : a.m. dry mouth [Obesity] : obesity [A.M. Headache] : headache present upon awakening [Arthralgias] : arthralgias [Nocturia] : nocturia [Depression] : depression [Anxious] : anxious [Difficulty Maintaining Sleep] : difficulty maintaining sleep [Fatigue] : no fatigue [Postnasal Drip] : no postnasal drip [Witnessed Apneas] : no witnessed apnea [Shortness Of Breath] : no shortness of breath [Orthopnea] : no orthopnea [Chest Pain] : no chest pain [Palpitations] : no palpitations [Edema] : ~T edema was not present [CHF] : no congestive heart failure [Thyroid Disease] : no thyroid disease [Diabetes] : no diabetes  [Anemia] : no anemia [Leg Dysesthesias] : no leg dysesthesias [Cataplexy] :  no cataplexy [Heartburn] : no heartburn [Difficulty Initiating Sleep] : no difficulty falling asleep [Lower Extremity Discomfort] : no lower extremity discomfort [Late day/ Evening symptoms] : no late day/evening symptoms [Unusual Sleep Behavior] : no unusual sleep behavior [Hypersomnolence] : not sleeping much more than usual [Sleep Paralysis] : no sleep paralysis

## 2021-04-29 NOTE — HISTORY OF PRESENT ILLNESS
[Obstructive Sleep Apnea] : obstructive sleep apnea [Frequent Nocturnal Awakening] : frequent nocturnal awakening [Awakes with Headache] : headache upon awakening [Awakening With Dry Mouth] : awakening with dry mouth [Daytime Somnolence] : daytime somnolence [DMS] : DMS [To Bed: ___] : ~he/she~ goes to bed at [unfilled] [Arises: ___] : arises at [unfilled] [Sleep Onset Latency: ___ minutes] : sleep onset latency of [unfilled] minutes reported [Nocturnal Awakenings: ___] : ~he/she~ typically has [unfilled] nocturnal awakenings [TST: ___] : Total sleep time is [unfilled] [Daytime Sleep: ___] : daytime sleep: [unfilled] [Date: ___] : Date of most recent diagnostic polysomnogram: [unfilled] [AHI: ___ per hour] : Apnea-hypopnea index:  [unfilled] per hour [FreeTextEntry1] : 70 yo female with history of VAUGHN, OA presents to the clinic for evaluation for Inspire. She states that she had a sleep study in 3/2016 at [facility] that showed severe sleep apnea. AHI at that time was 55.3. She was started on CPAP therapy but could not tolerate the mask or nasal pillows due claustrophobia. She states that when she gave birth to her daughter she had to have an emergency  and at that time required NIPPV, which traumatized her. She has also tried a oral appliance but states it is uncomfortable and gross. She originally was evaluated for Inspire at [facility] but she was too obese with a BMI around 40. She even had a DISE study which she states kept her qualified. Since that time she has been started on Saxenda and metformin and has lost about 42 pounds. Her sleep apnea symptoms however have improved since that time as well. Her main complaint now in nocturia where she gets up about 5 times per night. She also complains of AM headaches almost everyday and a dry mouth. Her daytime sleepiness is minimal. She is not sure is she snores.  [Witnessed Apneas] : no witnessed sleep apnea [Unintentional Sleep while Active] : no unintentional sleep while active [Unintentional Sleep While Inactive] : no unintentional sleep while inactive [Awakes Unrefreshed] : does not awake unrefreshed [Recent  Weight Gain] : no recent weight gain [DIS] : no DIS [Unusual Sleep Behavior] : no unusual sleep behavior [Hypersomnolence] : no hypersomnolence [Cataplexy] : no cataplexy [Lower Extremity Discomfort] : no lower extremity discomfort in evening or at bedtime [Nocturnal Oxygen] : The patient does not use nocturnal oxygen [ESS] : 4

## 2021-04-29 NOTE — PHYSICAL EXAM
[General Appearance - Well Developed] : well developed [Normal Appearance] : normal appearance [General Appearance - Well Nourished] : well nourished [No Deformities] : no deformities [Normal Conjunctiva] : the conjunctiva exhibited no abnormalities [Eyelids - No Xanthelasma] : the eyelids demonstrated no xanthelasmas [Low Lying Soft Palate] : low lying soft palate [Enlarged Base of the Tongue] : enlargement of the base of the tongue [IV] : IV [Neck Appearance] : the appearance of the neck was normal [Jugular Venous Distention Increased] : there was no jugular-venous distention [Apical Impulse] : the apical impulse was normal [Heart Rate And Rhythm] : heart rate was normal and rhythm regular [Heart Sounds] : normal S1 and S2 [Heart Sounds Gallop] : no gallops [Respiration, Rhythm And Depth] : normal respiratory rhythm and effort [Exaggerated Use Of Accessory Muscles For Inspiration] : no accessory muscle use [Auscultation Breath Sounds / Voice Sounds] : lungs were clear to auscultation bilaterally [Abnormal Walk] : normal gait [Involuntary Movements] : no involuntary movements were seen [Musculoskeletal - Swelling] : no joint swelling seen [Motor Tone] : muscle strength and tone were normal [Nail Clubbing] : no clubbing of the fingernails [Cyanosis, Localized] : no localized cyanosis [Petechial Hemorrhages (___cm)] : no petechial hemorrhages [Skin Color & Pigmentation] : normal skin color and pigmentation [Skin Lesions] : no skin lesions [] : no rash [Motor Exam] : the motor exam was normal [No Focal Deficits] : no focal deficits [Oriented To Time, Place, And Person] : oriented to person, place, and time [Impaired Insight] : insight and judgment were intact [Affect] : the affect was normal [Mood] : the mood was normal [Elongated Uvula] : no elongated uvula [Erythema] : no erythema of the pharynx

## 2021-04-29 NOTE — ASSESSMENT
[FreeTextEntry1] : 70 yo female with history of VAUGHN and OA presents to the clinic for evaluation for Inspire. She previously was evaluated in 2016 for an AHI of 55 and failed CPAP and oral appliance therapy. However at that time her BMI was >40. Since then she has lost 42 lbs and her BMI is now 31.  She has verbalized intolerance to CPAP and oral appliance therapy in the past.  However at this time she continues to have sleep apnea related symptomatology and understands the need to pursue therapy.  Because of the large decrease in weight since her last assessment objective documentation of the current degree of sleep disordered breathing is necessary for treatment planning.  Based on the results of this study we can then further advise as the next therapeutic options.  She is interested in pursuing inspire if she qualifies.  The ramifications of this therapeutic modality as well as its risks and benefits were fully discussed with the patient.\par \par Plan:\par -will arrange for HSAT\par -will send to ENT, Dr. Kent for evaluation for Inspire\par -educated to not drive or use heavy machinery while drowsy

## 2021-06-01 ENCOUNTER — APPOINTMENT (OUTPATIENT)
Dept: SLEEP CENTER | Facility: CLINIC | Age: 71
End: 2021-06-01
Payer: MEDICARE

## 2021-06-01 ENCOUNTER — OUTPATIENT (OUTPATIENT)
Dept: OUTPATIENT SERVICES | Facility: HOSPITAL | Age: 71
LOS: 1 days | End: 2021-06-01
Payer: MEDICARE

## 2021-06-01 DIAGNOSIS — Z98.890 OTHER SPECIFIED POSTPROCEDURAL STATES: Chronic | ICD-10-CM

## 2021-06-01 DIAGNOSIS — Z96.651 PRESENCE OF RIGHT ARTIFICIAL KNEE JOINT: Chronic | ICD-10-CM

## 2021-06-01 DIAGNOSIS — Z98.49 CATARACT EXTRACTION STATUS, UNSPECIFIED EYE: Chronic | ICD-10-CM

## 2021-06-01 DIAGNOSIS — Z96.612 PRESENCE OF LEFT ARTIFICIAL SHOULDER JOINT: Chronic | ICD-10-CM

## 2021-06-01 DIAGNOSIS — Z92.241 PERSONAL HISTORY OF SYSTEMIC STEROID THERAPY: Chronic | ICD-10-CM

## 2021-06-01 PROCEDURE — 95806 SLEEP STUDY UNATT&RESP EFFT: CPT | Mod: 26

## 2021-06-01 PROCEDURE — 95806 SLEEP STUDY UNATT&RESP EFFT: CPT

## 2021-06-07 ENCOUNTER — NON-APPOINTMENT (OUTPATIENT)
Age: 71
End: 2021-06-07

## 2021-06-07 DIAGNOSIS — G47.33 OBSTRUCTIVE SLEEP APNEA (ADULT) (PEDIATRIC): ICD-10-CM

## 2021-07-09 ENCOUNTER — NON-APPOINTMENT (OUTPATIENT)
Age: 71
End: 2021-07-09

## 2021-07-21 ENCOUNTER — APPOINTMENT (OUTPATIENT)
Dept: OTOLARYNGOLOGY | Facility: CLINIC | Age: 71
End: 2021-07-21
Payer: MEDICARE

## 2021-07-21 VITALS
DIASTOLIC BLOOD PRESSURE: 86 MMHG | BODY MASS INDEX: 31.16 KG/M2 | HEIGHT: 65 IN | HEART RATE: 86 BPM | SYSTOLIC BLOOD PRESSURE: 138 MMHG | WEIGHT: 187 LBS

## 2021-07-21 DIAGNOSIS — Z82.49 FAMILY HISTORY OF ISCHEMIC HEART DISEASE AND OTHER DISEASES OF THE CIRCULATORY SYSTEM: ICD-10-CM

## 2021-07-21 DIAGNOSIS — I10 ESSENTIAL (PRIMARY) HYPERTENSION: ICD-10-CM

## 2021-07-21 DIAGNOSIS — J34.2 DEVIATED NASAL SEPTUM: ICD-10-CM

## 2021-07-21 DIAGNOSIS — R23.2 FLUSHING: ICD-10-CM

## 2021-07-21 DIAGNOSIS — Z80.7 FAMILY HISTORY OF OTHER MALIGNANT NEOPLASMS OF LYMPHOID, HEMATOPOIETIC AND RELATED TISSUES: ICD-10-CM

## 2021-07-21 DIAGNOSIS — Z91.09 OTHER ALLERGY STATUS, OTHER THAN TO DRUGS AND BIOLOGICAL SUBSTANCES: ICD-10-CM

## 2021-07-21 DIAGNOSIS — E78.00 PURE HYPERCHOLESTEROLEMIA, UNSPECIFIED: ICD-10-CM

## 2021-07-21 DIAGNOSIS — Z80.3 FAMILY HISTORY OF MALIGNANT NEOPLASM OF BREAST: ICD-10-CM

## 2021-07-21 DIAGNOSIS — Z80.1 FAMILY HISTORY OF MALIGNANT NEOPLASM OF TRACHEA, BRONCHUS AND LUNG: ICD-10-CM

## 2021-07-21 DIAGNOSIS — Z78.9 OTHER SPECIFIED HEALTH STATUS: ICD-10-CM

## 2021-07-21 PROCEDURE — 31575 DIAGNOSTIC LARYNGOSCOPY: CPT

## 2021-07-21 PROCEDURE — 99204 OFFICE O/P NEW MOD 45 MIN: CPT | Mod: 25

## 2021-07-21 RX ORDER — ROSUVASTATIN CALCIUM 5 MG/1
TABLET, FILM COATED ORAL
Refills: 0 | Status: ACTIVE | COMMUNITY

## 2021-07-21 RX ORDER — METFORMIN HYDROCHLORIDE 625 MG/1
TABLET ORAL
Refills: 0 | Status: ACTIVE | COMMUNITY

## 2021-07-21 NOTE — HISTORY OF PRESENT ILLNESS
[de-identified] : 71 year old female referred by Dr Andres for sleep apnea. Patient had sleep study conducted on 6/1/2021 showing an LELE of 50.9  with central and mixed apneas less than 25% of the total events. Patient has a BMI of 31.12. Patient has tried a CPAP machine without relief. Complains of removing mask during sleep. Patient has tried various types of masks without relief. States mask cause claustrophobia, skin irritation, dyspnea, dry mouth. States has tried using a dental appliance without relief. Patient has sleeping partner who complains about the noise of CPAP machine, stating it keeps them awake at night. Patient states tried losing weight, approximately  40 lbs. in the past 3 years with no relief from sleep apnea. Patient comorbidities include weight gain, hypertension, daytime fatigue, difficulty driving long distances due to fatigue.\par \par \par

## 2021-07-21 NOTE — PHYSICAL EXAM
[Midline] : trachea located in midline position [Normal] : no rashes [FreeTextEntry1] : Obesity, VAUGHN

## 2021-07-21 NOTE — PROCEDURE
[Image(s) Captured] : image(s) captured and filed [Video Captured] : video captured and filed [Unable to Cooperate with Mirror] : patient unable to cooperate with mirror [Obstruction] : acute airway obstruction evaluation [Complicated Symptoms] : complicated symptoms requiring more thorough examination than provided by mirror [Topical Lidocaine] : topical lidocaine [Oxymetazoline HCl] : oxymetazoline HCl [Flexible Endoscope] : examined with the flexible endoscope [Serial Number: ___] : Serial Number: [unfilled] [Velopharynx ___ %] : the velopharynx was [unfilled]U% collapsed [Normal] : the false vocal folds were pink and regular, the ventricular sulcus was open, the true vocal folds were glistening white, tense and of equal length, mobility, and height [True Vocal Cords Paralysis] : no true vocal cord paralysis [True Vocal Cords Erythematous] : no true vocal cord edema [True Vocal Cords Barton's Nodules] : no true vocal cord nodules [Glottis Arytenoid Cartilages] : no arytenoid granulomas [Glottis Arytenoid Cartilages Erythema] : no arytenoid erythema [Arytenoid Edema ___ /4] : bilaterally were normal [Arytenoid Erythema ___ /4] : bilaterally were normal [de-identified] : Patient was placed in the examination chair in a sitting position. The nose was decongested with oxymetazoline nasal solution. The airway was anesthetized with 4% Xylocaine.  The back of the throat was anesthetized with Cetacaine. Direct flexible/rigid video endoscopy was performed. Findings revealed:\par severely deviated nasal septum completely obstructing the nasal fossa turbinate hypertrophy nasopharynx a positive Müller maneuver. Epiglottis larynx normal [de-identified] : VAUGHN

## 2021-07-21 NOTE — CONSULT LETTER
[Dear  ___] : Dear  [unfilled], [Consult Letter:] : I had the pleasure of evaluating your patient, [unfilled]. [Please see my note below.] : Please see my note below. [Consult Closing:] : Thank you very much for allowing me to participate in the care of this patient.  If you have any questions, please do not hesitate to contact me. [Sincerely,] : Sincerely, [FreeTextEntry3] : Von Kent MD, LORENA, FACS\par  Department Otolaryngology\par Director of [facility]\par Professor of Otolaryngology, \par [employer]\par

## 2021-07-21 NOTE — REASON FOR VISIT
[Initial Evaluation] : an initial evaluation for [FreeTextEntry2] : referred by Dr Andres for sleep apnea

## 2021-09-10 ENCOUNTER — OUTPATIENT (OUTPATIENT)
Dept: OUTPATIENT SERVICES | Facility: HOSPITAL | Age: 71
LOS: 1 days | End: 2021-09-10
Payer: MEDICARE

## 2021-09-10 VITALS
OXYGEN SATURATION: 98 % | WEIGHT: 188.94 LBS | TEMPERATURE: 97 F | DIASTOLIC BLOOD PRESSURE: 75 MMHG | SYSTOLIC BLOOD PRESSURE: 118 MMHG | HEART RATE: 77 BPM | HEIGHT: 63.5 IN | RESPIRATION RATE: 16 BRPM

## 2021-09-10 DIAGNOSIS — G47.33 OBSTRUCTIVE SLEEP APNEA (ADULT) (PEDIATRIC): ICD-10-CM

## 2021-09-10 DIAGNOSIS — Z98.890 OTHER SPECIFIED POSTPROCEDURAL STATES: Chronic | ICD-10-CM

## 2021-09-10 DIAGNOSIS — J34.2 DEVIATED NASAL SEPTUM: ICD-10-CM

## 2021-09-10 DIAGNOSIS — M19.90 UNSPECIFIED OSTEOARTHRITIS, UNSPECIFIED SITE: ICD-10-CM

## 2021-09-10 DIAGNOSIS — Z92.241 PERSONAL HISTORY OF SYSTEMIC STEROID THERAPY: Chronic | ICD-10-CM

## 2021-09-10 DIAGNOSIS — Z98.49 CATARACT EXTRACTION STATUS, UNSPECIFIED EYE: Chronic | ICD-10-CM

## 2021-09-10 DIAGNOSIS — Z96.612 PRESENCE OF LEFT ARTIFICIAL SHOULDER JOINT: Chronic | ICD-10-CM

## 2021-09-10 DIAGNOSIS — I10 ESSENTIAL (PRIMARY) HYPERTENSION: ICD-10-CM

## 2021-09-10 DIAGNOSIS — R63.4 ABNORMAL WEIGHT LOSS: ICD-10-CM

## 2021-09-10 DIAGNOSIS — J34.3 HYPERTROPHY OF NASAL TURBINATES: ICD-10-CM

## 2021-09-10 DIAGNOSIS — K21.9 GASTRO-ESOPHAGEAL REFLUX DISEASE WITHOUT ESOPHAGITIS: ICD-10-CM

## 2021-09-10 DIAGNOSIS — Z96.651 PRESENCE OF RIGHT ARTIFICIAL KNEE JOINT: Chronic | ICD-10-CM

## 2021-09-10 LAB
A1C WITH ESTIMATED AVERAGE GLUCOSE RESULT: 5 % — SIGNIFICANT CHANGE UP (ref 4–5.6)
ANION GAP SERPL CALC-SCNC: 11 MMOL/L — SIGNIFICANT CHANGE UP (ref 7–14)
BUN SERPL-MCNC: 15 MG/DL — SIGNIFICANT CHANGE UP (ref 7–23)
CALCIUM SERPL-MCNC: 9.1 MG/DL — SIGNIFICANT CHANGE UP (ref 8.4–10.5)
CHLORIDE SERPL-SCNC: 104 MMOL/L — SIGNIFICANT CHANGE UP (ref 98–107)
CO2 SERPL-SCNC: 25 MMOL/L — SIGNIFICANT CHANGE UP (ref 22–31)
CREAT SERPL-MCNC: 0.7 MG/DL — SIGNIFICANT CHANGE UP (ref 0.5–1.3)
ESTIMATED AVERAGE GLUCOSE: 97 — SIGNIFICANT CHANGE UP
GLUCOSE SERPL-MCNC: 84 MG/DL — SIGNIFICANT CHANGE UP (ref 70–99)
HCT VFR BLD CALC: 35.7 % — SIGNIFICANT CHANGE UP (ref 34.5–45)
HGB BLD-MCNC: 11.7 G/DL — SIGNIFICANT CHANGE UP (ref 11.5–15.5)
MCHC RBC-ENTMCNC: 28.3 PG — SIGNIFICANT CHANGE UP (ref 27–34)
MCHC RBC-ENTMCNC: 32.8 GM/DL — SIGNIFICANT CHANGE UP (ref 32–36)
MCV RBC AUTO: 86.4 FL — SIGNIFICANT CHANGE UP (ref 80–100)
NRBC # BLD: 0 /100 WBCS — SIGNIFICANT CHANGE UP
NRBC # FLD: 0 K/UL — SIGNIFICANT CHANGE UP
PLATELET # BLD AUTO: 283 K/UL — SIGNIFICANT CHANGE UP (ref 150–400)
POTASSIUM SERPL-MCNC: 3.9 MMOL/L — SIGNIFICANT CHANGE UP (ref 3.5–5.3)
POTASSIUM SERPL-SCNC: 3.9 MMOL/L — SIGNIFICANT CHANGE UP (ref 3.5–5.3)
RBC # BLD: 4.13 M/UL — SIGNIFICANT CHANGE UP (ref 3.8–5.2)
RBC # FLD: 13.1 % — SIGNIFICANT CHANGE UP (ref 10.3–14.5)
SODIUM SERPL-SCNC: 140 MMOL/L — SIGNIFICANT CHANGE UP (ref 135–145)
WBC # BLD: 6.81 K/UL — SIGNIFICANT CHANGE UP (ref 3.8–10.5)
WBC # FLD AUTO: 6.81 K/UL — SIGNIFICANT CHANGE UP (ref 3.8–10.5)

## 2021-09-10 PROCEDURE — 93010 ELECTROCARDIOGRAM REPORT: CPT

## 2021-09-10 RX ORDER — CELECOXIB 200 MG/1
1 CAPSULE ORAL
Qty: 0 | Refills: 0 | DISCHARGE

## 2021-09-10 RX ORDER — LIRAGLUTIDE 6 MG/ML
0 INJECTION SUBCUTANEOUS
Qty: 0 | Refills: 0 | DISCHARGE

## 2021-09-10 RX ORDER — DULOXETINE HYDROCHLORIDE 30 MG/1
1 CAPSULE, DELAYED RELEASE ORAL
Qty: 0 | Refills: 0 | DISCHARGE

## 2021-09-10 RX ORDER — LOSARTAN POTASSIUM 100 MG/1
1 TABLET, FILM COATED ORAL
Qty: 0 | Refills: 0 | DISCHARGE

## 2021-09-10 NOTE — H&P PST ADULT - OTHER CARE PROVIDERS
Dr Lomeli Rheumatologist Dr Lomeli Rheumatologist                             Dr Landers Weight loss doctor ; [phone] Dr Lomeli Rheumatologist                                          Dr Landers Weight loss doctor ; [phone]

## 2021-09-10 NOTE — H&P PST ADULT - PRIMARY CARE PROVIDER
Dr Mehta pcp  [phone]   Dr Henderson cardiologist   Dr Mehta pcp  [phone]   Dr Henderson cardiologist  [phone]               Dr [facility] ;  [phone] Dr Mehta pcp  [phone]                                        Dr Henderson cardiologist  [phone]                      Dr [facility] ;  [phone]

## 2021-09-10 NOTE — H&P PST ADULT - PROBLEM SELECTOR PLAN 1
Drug Induced Sleep Endoscopy, Septoplasty , Bilateral Turbinectomy    Pre op instructions reviewed with pt ; pt verbalized good understanding of pre op instructions    Dr Mehta to provide pre op Medical evaluation    Pt aware Covid test to be scheduled pre op    Pt to review plan for Metformin and Wegavy with Dr Landers pre op Drug Induced Sleep Endoscopy, Septoplasty , Bilateral Turbinectomy    Pre op instructions reviewed with pt ; pt verbalized good understanding of pre op instructions    Dr Mehta to provide pre op Medical evaluation    Pt aware Covid test to be scheduled pre op    Pt to hold Metformin dos  Pt to review plan for Metformin and Wegavy with Dr Landers pre op

## 2021-09-10 NOTE — H&P PST ADULT - NSICDXPASTMEDICALHX_GEN_ALL_CORE_FT
PAST MEDICAL HISTORY:  Anxiety     Depression     GERD (gastroesophageal reflux disease) Last Endoscopy 2019    Herpes zoster > 10 years ago    Hypercholesterolemia     Hypertension     Lumbar herniated disc     OA (osteoarthritis) pt f/u rheumatologist    Obesity Pt rports 40 lb weight lss    VAUGHN (obstructive sleep apnea) pt reports  40 lbs weight loss  ; unable to tolerate cpap and not using dental appliance    Spinal stenosis cervical and lumbar; pt to pain management Last MRI 2019    Varicose veins lower extremi     PAST MEDICAL HISTORY:  Anxiety     Depression     GERD (gastroesophageal reflux disease) Last Endoscopy 2019    Herpes zoster > 10 years ago    Hypercholesterolemia     Hypertension     Lumbar herniated disc     OA (osteoarthritis) pt f/u rheumatologist    Obesity Rx Metformin , Wegovy ; Pt reports 40 lb weight lss    VAUGHN (obstructive sleep apnea) pt reports  40 lbs weight loss  ; unable to tolerate cpap and not using dental appliance    Spinal stenosis cervical and lumbar; pt to pain management Last MRI 2019    Varicose veins lower extremi

## 2021-09-10 NOTE — H&P PST ADULT - HISTORY OF PRESENT ILLNESS
Pt is a 71 y.o. female ;pt dx with VAUGHN  2016, pt states unable to tolerate Cpap , pt s/p 40 lb weight loss. Pt reports  sleep study 6/01/21 ' Severe sleep apnea" Pt to surgeon ; pt states " i also have a deviated septum " Pt now presents for Drug Induced Sleep Endoscopy , Septoplasty , Bilateral Turbinectomy  ,  Pt is a 71 y.o. female ;pt dx with VAUGHN  2016, pt states unable to tolerate Cpap , pt s/p 40 lb weight loss. Pt reports  sleep study 6/01/21 ' Severe sleep apnea" Pt to surgeon ; pt states " I also have a deviated septum " Pt now presents for Drug Induced Sleep Endoscopy , Septoplasty , Bilateral Turbinectomy  ,

## 2021-09-10 NOTE — H&P PST ADULT - ENDOCRINE COMMENTS
Pt denies thyroid disease Pt denies thyroid disease, denies diabetes RX metformin , Wegovy ; per pt " for weight loss "

## 2021-09-10 NOTE — H&P PST ADULT - NSICDXPASTSURGICALHX_GEN_ALL_CORE_FT
PAST SURGICAL HISTORY:  H/O hand surgery with implants;  multiple    H/O total shoulder replacement, left 1/2017    S/P carpal tunnel release bilateral    S/P cataract surgery bilateral IOL    S/P epidural steroid injection     S/P TKR (total knee replacement), right 3/2018     PAST SURGICAL HISTORY:  H/O hand surgery with implants;  multiple    H/O total shoulder replacement, left 1/2017    S/P carpal tunnel release bilateral    S/P cataract surgery bilateral IOL    S/P epidural steroid injection     S/P TKR (total knee replacement), right 3/2018    Status post D&C 10/19

## 2021-09-10 NOTE — H&P PST ADULT - PROBLEM SELECTOR PLAN 3
Pt with severe pain ; Lumbar and Cervical spine    Limited mobility noted     Request last MRI Cervical and Lumbar spine    Reviewed with Dr Hall Pt with severe pain ; Lumbar and Cervical spine    Limited mobility noted     Request last MRI Cervical  spine    Reviewed with Dr Hall

## 2021-09-10 NOTE — H&P PST ADULT - MALLAMPATI CLASS
Class III - visualization of the soft palate and the base of the uvula Limited mobility neck/Class III - visualization of the soft palate and the base of the uvula

## 2021-09-18 ENCOUNTER — APPOINTMENT (OUTPATIENT)
Dept: DISASTER EMERGENCY | Facility: CLINIC | Age: 71
End: 2021-09-18

## 2021-09-18 DIAGNOSIS — Z01.818 ENCOUNTER FOR OTHER PREPROCEDURAL EXAMINATION: ICD-10-CM

## 2021-09-20 ENCOUNTER — TRANSCRIPTION ENCOUNTER (OUTPATIENT)
Age: 71
End: 2021-09-20

## 2021-09-20 LAB — SARS-COV-2 N GENE NPH QL NAA+PROBE: NOT DETECTED

## 2021-09-20 NOTE — ASU PATIENT PROFILE, ADULT - NSICDXPASTSURGICALHX_GEN_ALL_CORE_FT
PAST SURGICAL HISTORY:  H/O hand surgery with implants;  multiple    H/O total shoulder replacement, left 1/2017    S/P carpal tunnel release bilateral    S/P cataract surgery bilateral IOL    S/P epidural steroid injection     S/P TKR (total knee replacement), right 3/2018    Status post D&C 10/19

## 2021-09-20 NOTE — ASU PATIENT PROFILE, ADULT - NSICDXPASTMEDICALHX_GEN_ALL_CORE_FT
PAST MEDICAL HISTORY:  Anxiety     Depression     GERD (gastroesophageal reflux disease) Last Endoscopy 2019    Herpes zoster > 10 years ago    Hypercholesterolemia     Hypertension     Lumbar herniated disc     OA (osteoarthritis) pt f/u rheumatologist    Obesity Rx Metformin , Wegovy ; Pt reports 40 lb weight lss    VAUGHN (obstructive sleep apnea) pt reports  40 lbs weight loss  ; unable to tolerate cpap and not using dental appliance    Spinal stenosis cervical and lumbar; pt to pain management Last MRI 2019    Varicose veins lower extremi

## 2021-09-21 ENCOUNTER — APPOINTMENT (OUTPATIENT)
Dept: OTOLARYNGOLOGY | Facility: HOSPITAL | Age: 71
End: 2021-09-21

## 2021-09-21 ENCOUNTER — RESULT REVIEW (OUTPATIENT)
Age: 71
End: 2021-09-21

## 2021-09-21 ENCOUNTER — OUTPATIENT (OUTPATIENT)
Dept: OUTPATIENT SERVICES | Facility: HOSPITAL | Age: 71
LOS: 1 days | Discharge: HOME | End: 2021-09-21
Payer: MEDICARE

## 2021-09-21 VITALS
WEIGHT: 188.94 LBS | SYSTOLIC BLOOD PRESSURE: 133 MMHG | TEMPERATURE: 99 F | OXYGEN SATURATION: 99 % | HEIGHT: 63 IN | HEART RATE: 80 BPM | RESPIRATION RATE: 16 BRPM | DIASTOLIC BLOOD PRESSURE: 79 MMHG

## 2021-09-21 DIAGNOSIS — Z98.890 OTHER SPECIFIED POSTPROCEDURAL STATES: Chronic | ICD-10-CM

## 2021-09-21 DIAGNOSIS — G47.33 OBSTRUCTIVE SLEEP APNEA (ADULT) (PEDIATRIC): ICD-10-CM

## 2021-09-21 DIAGNOSIS — Z96.612 PRESENCE OF LEFT ARTIFICIAL SHOULDER JOINT: Chronic | ICD-10-CM

## 2021-09-21 DIAGNOSIS — Z98.49 CATARACT EXTRACTION STATUS, UNSPECIFIED EYE: Chronic | ICD-10-CM

## 2021-09-21 DIAGNOSIS — Z96.651 PRESENCE OF RIGHT ARTIFICIAL KNEE JOINT: Chronic | ICD-10-CM

## 2021-09-21 DIAGNOSIS — Z92.241 PERSONAL HISTORY OF SYSTEMIC STEROID THERAPY: Chronic | ICD-10-CM

## 2021-09-21 PROCEDURE — 88311 DECALCIFY TISSUE: CPT | Mod: 26

## 2021-09-21 PROCEDURE — 30130 EXCISE INFERIOR TURBINATE: CPT | Mod: 50,GC

## 2021-09-21 PROCEDURE — 30520 REPAIR OF NASAL SEPTUM: CPT | Mod: GC

## 2021-09-21 PROCEDURE — 31575 DIAGNOSTIC LARYNGOSCOPY: CPT | Mod: GC

## 2021-09-21 PROCEDURE — 88304 TISSUE EXAM BY PATHOLOGIST: CPT | Mod: 26

## 2021-09-21 RX ORDER — DULOXETINE HYDROCHLORIDE 30 MG/1
60 CAPSULE, DELAYED RELEASE ORAL AT BEDTIME
Refills: 0 | Status: DISCONTINUED | OUTPATIENT
Start: 2021-09-21 | End: 2021-10-05

## 2021-09-21 RX ORDER — CEPHALEXIN 500 MG
500 CAPSULE ORAL EVERY 12 HOURS
Refills: 0 | Status: DISCONTINUED | OUTPATIENT
Start: 2021-09-21 | End: 2021-10-05

## 2021-09-21 RX ORDER — LOSARTAN POTASSIUM 100 MG/1
50 TABLET, FILM COATED ORAL DAILY
Refills: 0 | Status: DISCONTINUED | OUTPATIENT
Start: 2021-09-22 | End: 2021-10-05

## 2021-09-21 RX ORDER — FENTANYL CITRATE 50 UG/ML
50 INJECTION INTRAVENOUS
Refills: 0 | Status: DISCONTINUED | OUTPATIENT
Start: 2021-09-21 | End: 2021-09-21

## 2021-09-21 RX ORDER — METOCLOPRAMIDE HCL 10 MG
10 TABLET ORAL ONCE
Refills: 0 | Status: DISCONTINUED | OUTPATIENT
Start: 2021-09-21 | End: 2021-10-05

## 2021-09-21 RX ORDER — OXYCODONE AND ACETAMINOPHEN 5; 325 MG/1; MG/1
1 TABLET ORAL EVERY 6 HOURS
Refills: 0 | Status: DISCONTINUED | OUTPATIENT
Start: 2021-09-21 | End: 2021-09-22

## 2021-09-21 RX ORDER — CYCLOBENZAPRINE HYDROCHLORIDE 10 MG/1
10 TABLET, FILM COATED ORAL ONCE
Refills: 0 | Status: COMPLETED | OUTPATIENT
Start: 2021-09-21 | End: 2021-09-21

## 2021-09-21 RX ORDER — SODIUM CHLORIDE 9 MG/ML
1000 INJECTION, SOLUTION INTRAVENOUS
Refills: 0 | Status: DISCONTINUED | OUTPATIENT
Start: 2021-09-21 | End: 2021-10-05

## 2021-09-21 RX ADMIN — DULOXETINE HYDROCHLORIDE 60 MILLIGRAM(S): 30 CAPSULE, DELAYED RELEASE ORAL at 22:00

## 2021-09-21 RX ADMIN — Medication 500 MILLIGRAM(S): at 19:37

## 2021-09-21 RX ADMIN — CYCLOBENZAPRINE HYDROCHLORIDE 10 MILLIGRAM(S): 10 TABLET, FILM COATED ORAL at 22:31

## 2021-09-21 RX ADMIN — FENTANYL CITRATE 50 MICROGRAM(S): 50 INJECTION INTRAVENOUS at 18:45

## 2021-09-21 RX ADMIN — OXYCODONE AND ACETAMINOPHEN 1 TABLET(S): 5; 325 TABLET ORAL at 21:01

## 2021-09-21 RX ADMIN — SODIUM CHLORIDE 75 MILLILITER(S): 9 INJECTION, SOLUTION INTRAVENOUS at 18:26

## 2021-09-21 RX ADMIN — FENTANYL CITRATE 50 MICROGRAM(S): 50 INJECTION INTRAVENOUS at 18:30

## 2021-09-22 ENCOUNTER — APPOINTMENT (OUTPATIENT)
Dept: OTOLARYNGOLOGY | Facility: CLINIC | Age: 71
End: 2021-09-22

## 2021-09-22 VITALS
HEART RATE: 96 BPM | SYSTOLIC BLOOD PRESSURE: 130 MMHG | OXYGEN SATURATION: 98 % | DIASTOLIC BLOOD PRESSURE: 89 MMHG | RESPIRATION RATE: 21 BRPM

## 2021-09-22 RX ORDER — CEPHALEXIN 500 MG
1 CAPSULE ORAL
Qty: 14 | Refills: 0
Start: 2021-09-22 | End: 2021-09-28

## 2021-09-22 RX ORDER — BUTALBITAL/ASPIRIN/CAFFEINE 50-325-40
1 TABLET ORAL
Qty: 0 | Refills: 0 | DISCHARGE

## 2021-09-22 RX ADMIN — OXYCODONE AND ACETAMINOPHEN 1 TABLET(S): 5; 325 TABLET ORAL at 03:00

## 2021-09-22 RX ADMIN — Medication 500 MILLIGRAM(S): at 07:30

## 2021-09-22 RX ADMIN — LOSARTAN POTASSIUM 50 MILLIGRAM(S): 100 TABLET, FILM COATED ORAL at 06:39

## 2021-09-22 RX ADMIN — OXYCODONE AND ACETAMINOPHEN 1 TABLET(S): 5; 325 TABLET ORAL at 03:30

## 2021-09-22 NOTE — PATIENT PROFILE ADULT - ARRIVAL FROM
"Subjective    Iram Germain is a 8 year old female who presents to clinic today with father because of:  chief complaint   HPI     Concerns: Ear infection     Pt woke up last night in the middle of the night crying and in pain stating her left ear hurt. Crying today stating her left ear hurts          Review of Systems  GENERAL:  Fever - YES;   SKIN:  NEGATIVE for rash, hives, and eczema.  EYE:  NEGATIVE for pain, discharge, redness, itching and vision problems.  ENT:  Ear Pain - YES; Runny nose - YES; Congestion - YES;  RESP:  Cough - YES;  CARDIAC:  NEGATIVE for chest pain and cyanosis.   GI:  NEGATIVE for vomiting, diarrhea, abdominal pain and constipation.  :  NEGATIVE for urinary problems.  NEURO:  NEGATIVE for headache and weakness.  ALLERGY:  As in Allergy History  MSK:  NEGATIVE for muscle problems and joint problems.  PROBLEM LIST  Patient Active Problem List    Diagnosis Date Noted     Influenza A 03/04/2019     Priority: Medium     Concern about behavior of biological child 05/07/2017     Priority: Medium     Alpha-1-antitrypsin deficiency carrier (H) 03/16/2017     Priority: Medium     Eczema 09/11/2012     Priority: Medium      MEDICATIONS    Current Outpatient Medications on File Prior to Visit:  Acetaminophen (TYLENOL PO)    ELDERBERRY PO    IBUPROFEN CHILDRENS PO    MELATONIN PO Take 2.5 mg by mouth nightly as needed   PEDIATRIC MULTIPLE VIT-C-FA PO    VITAMIN D, CHOLECALCIFEROL, PO Take 2,000 Units by mouth daily     No current facility-administered medications on file prior to visit.   ALLERGIES  No Known Allergies  Reviewed and updated as needed this visit by Provider           Objective    BP 92/68   Pulse 97   Temp 99.3  F (37.4  C) (Tympanic)   Ht 1.402 m (4' 7.2\")   Wt 46.3 kg (102 lb)   SpO2 98%   BMI 23.54 kg/m    84 %ile based on CDC (Girls, 2-20 Years) Stature-for-age data based on Stature recorded on 5/21/2019.  97 %ile based on CDC (Girls, 2-20 Years) weight-for-age data " based on Weight recorded on 5/21/2019.  97 %ile based on CDC (Girls, 2-20 Years) BMI-for-age based on body measurements available as of 5/21/2019.  Blood pressure percentiles are 20 % systolic and 78 % diastolic based on the August 2017 AAP Clinical Practice Guideline.     Physical Exam  GENERAL: Active, alert, in no acute distress.  SKIN: Clear. No significant rash, abnormal pigmentation or lesions  HEAD: Normocephalic.  EYES:  No discharge or erythema. Normal pupils and EOM.  LEFT EAR: erythematous  NOSE: clear rhinorrhea and congested  MOUTH/THROAT: Clear. No oral lesions. Teeth intact without obvious abnormalities.  NECK: Supple, no masses.  LYMPH NODES: No adenopathy  LUNGS: central congestion and dry cough  HEART: Regular rhythm. Normal S1/S2. No murmurs.  ABDOMEN: Soft, non-tender, not distended, no masses or hepatosplenomegaly. Bowel sounds normal.   Diagnostics: None      Assessment      ICD-10-CM    1. Acute suppurative otitis media of left ear without spontaneous rupture of tympanic membrane, recurrence not specified H66.002 azithromycin (ZITHROMAX) 200 MG/5ML suspension     FOLLOW UP: If not improving or if worsening  Hanna Hui LPN           Home

## 2021-09-22 NOTE — ASU DISCHARGE PLAN (ADULT/PEDIATRIC) - CALL YOUR DOCTOR IF YOU HAVE ANY OF THE FOLLOWING:
Bleeding that does not stop/Swelling that gets worse/Pain not relieved by Medications/Fever greater than (need to indicate Fahrenheit or Celsius)/Wound/Surgical Site with redness, or foul smelling discharge or pus/Inability to tolerate liquids or foods/Increased irritability or sluggishness

## 2021-09-22 NOTE — ASU DISCHARGE PLAN (ADULT/PEDIATRIC) - CARE PROVIDER_API CALL
Von Kent; LORENA)  Otolaryngology  [address] [place] [address] [id number]  Phone: [phone]  Fax: [phone]  Follow Up Time:

## 2021-09-22 NOTE — ASU DISCHARGE PLAN (ADULT/PEDIATRIC) - NURSING INSTRUCTIONS
Last dose of Percocet for pain management was at 3 AM. Next dose may be taken at or after 9 AM if needed. DO NOT take any additional products containing TYLENOL or ACETAMINOPHEN, such as VICODIN, NORCO, EXCEDRIN, and any over-the-counter cold medications. DO NOT CONSUME MORE THAN 3573-5106 MG OF TYLENOL (acetaminophen) in a 24-hour period.

## 2021-09-27 LAB — SURGICAL PATHOLOGY STUDY: SIGNIFICANT CHANGE UP

## 2021-09-29 ENCOUNTER — APPOINTMENT (OUTPATIENT)
Dept: OTOLARYNGOLOGY | Facility: CLINIC | Age: 71
End: 2021-09-29
Payer: MEDICARE

## 2021-09-29 PROCEDURE — 99024 POSTOP FOLLOW-UP VISIT: CPT

## 2021-09-30 NOTE — HISTORY OF PRESENT ILLNESS
[de-identified] : Pt is healing well. Pt admits to using saline diligently throughout the week. Pt has moderate nasal congestion and mild pain.\par

## 2021-09-30 NOTE — REASON FOR VISIT
[Post-Operative Visit] : a post-operative visit [FreeTextEntry2] : splint removal [FreeTextEntry1] : s/p septoplasty and turbinectomy DISE

## 2021-09-30 NOTE — PROCEDURE
[FreeTextEntry3] : Nose packed with Lidocaine and Afrin soaked cotton. Nose suctioned and crusts debrided. Nasal splint and sutures removed. Pt can breathe well.\par

## 2021-12-01 PROBLEM — E78.00 PURE HYPERCHOLESTEROLEMIA, UNSPECIFIED: Chronic | Status: ACTIVE | Noted: 2021-09-10

## 2021-12-01 PROBLEM — F41.9 ANXIETY DISORDER, UNSPECIFIED: Chronic | Status: ACTIVE | Noted: 2021-09-10

## 2021-12-01 PROBLEM — B02.9 ZOSTER WITHOUT COMPLICATIONS: Chronic | Status: ACTIVE | Noted: 2019-10-07

## 2021-12-01 PROBLEM — M48.00 SPINAL STENOSIS, SITE UNSPECIFIED: Chronic | Status: ACTIVE | Noted: 2019-10-07

## 2021-12-01 PROBLEM — M19.90 UNSPECIFIED OSTEOARTHRITIS, UNSPECIFIED SITE: Chronic | Status: ACTIVE | Noted: 2019-10-07

## 2021-12-01 PROBLEM — K21.9 GASTRO-ESOPHAGEAL REFLUX DISEASE WITHOUT ESOPHAGITIS: Chronic | Status: ACTIVE | Noted: 2019-10-07

## 2022-01-06 ENCOUNTER — OUTPATIENT (OUTPATIENT)
Dept: OUTPATIENT SERVICES | Facility: HOSPITAL | Age: 72
LOS: 1 days | End: 2022-01-06
Payer: MEDICARE

## 2022-01-06 VITALS
TEMPERATURE: 98 F | SYSTOLIC BLOOD PRESSURE: 145 MMHG | WEIGHT: 182.1 LBS | HEART RATE: 71 BPM | DIASTOLIC BLOOD PRESSURE: 69 MMHG | OXYGEN SATURATION: 98 % | RESPIRATION RATE: 14 BRPM | HEIGHT: 63 IN

## 2022-01-06 DIAGNOSIS — G47.33 OBSTRUCTIVE SLEEP APNEA (ADULT) (PEDIATRIC): ICD-10-CM

## 2022-01-06 DIAGNOSIS — Z96.612 PRESENCE OF LEFT ARTIFICIAL SHOULDER JOINT: Chronic | ICD-10-CM

## 2022-01-06 DIAGNOSIS — Z92.241 PERSONAL HISTORY OF SYSTEMIC STEROID THERAPY: Chronic | ICD-10-CM

## 2022-01-06 DIAGNOSIS — Z98.49 CATARACT EXTRACTION STATUS, UNSPECIFIED EYE: Chronic | ICD-10-CM

## 2022-01-06 DIAGNOSIS — Z98.890 OTHER SPECIFIED POSTPROCEDURAL STATES: Chronic | ICD-10-CM

## 2022-01-06 DIAGNOSIS — Z96.651 PRESENCE OF RIGHT ARTIFICIAL KNEE JOINT: Chronic | ICD-10-CM

## 2022-01-06 DIAGNOSIS — Z86.69 PERSONAL HISTORY OF OTHER DISEASES OF THE NERVOUS SYSTEM AND SENSE ORGANS: ICD-10-CM

## 2022-01-06 LAB
A1C WITH ESTIMATED AVERAGE GLUCOSE RESULT: 5.2 % — SIGNIFICANT CHANGE UP (ref 4–5.6)
ANION GAP SERPL CALC-SCNC: 7 MMOL/L — SIGNIFICANT CHANGE UP (ref 7–14)
BUN SERPL-MCNC: 16 MG/DL — SIGNIFICANT CHANGE UP (ref 7–23)
CALCIUM SERPL-MCNC: 9.5 MG/DL — SIGNIFICANT CHANGE UP (ref 8.4–10.5)
CHLORIDE SERPL-SCNC: 102 MMOL/L — SIGNIFICANT CHANGE UP (ref 98–107)
CO2 SERPL-SCNC: 29 MMOL/L — SIGNIFICANT CHANGE UP (ref 22–31)
CREAT SERPL-MCNC: 0.72 MG/DL — SIGNIFICANT CHANGE UP (ref 0.5–1.3)
ESTIMATED AVERAGE GLUCOSE: 103 — SIGNIFICANT CHANGE UP
GLUCOSE SERPL-MCNC: 84 MG/DL — SIGNIFICANT CHANGE UP (ref 70–99)
HCT VFR BLD CALC: 38.4 % — SIGNIFICANT CHANGE UP (ref 34.5–45)
HGB BLD-MCNC: 12.3 G/DL — SIGNIFICANT CHANGE UP (ref 11.5–15.5)
MCHC RBC-ENTMCNC: 27.6 PG — SIGNIFICANT CHANGE UP (ref 27–34)
MCHC RBC-ENTMCNC: 32 GM/DL — SIGNIFICANT CHANGE UP (ref 32–36)
MCV RBC AUTO: 86.3 FL — SIGNIFICANT CHANGE UP (ref 80–100)
NRBC # BLD: 0 /100 WBCS — SIGNIFICANT CHANGE UP
NRBC # FLD: 0 K/UL — SIGNIFICANT CHANGE UP
PLATELET # BLD AUTO: 299 K/UL — SIGNIFICANT CHANGE UP (ref 150–400)
POTASSIUM SERPL-MCNC: 4.7 MMOL/L — SIGNIFICANT CHANGE UP (ref 3.5–5.3)
POTASSIUM SERPL-SCNC: 4.7 MMOL/L — SIGNIFICANT CHANGE UP (ref 3.5–5.3)
RBC # BLD: 4.45 M/UL — SIGNIFICANT CHANGE UP (ref 3.8–5.2)
RBC # FLD: 13.9 % — SIGNIFICANT CHANGE UP (ref 10.3–14.5)
SODIUM SERPL-SCNC: 138 MMOL/L — SIGNIFICANT CHANGE UP (ref 135–145)
WBC # BLD: 5.72 K/UL — SIGNIFICANT CHANGE UP (ref 3.8–10.5)
WBC # FLD AUTO: 5.72 K/UL — SIGNIFICANT CHANGE UP (ref 3.8–10.5)

## 2022-01-06 PROCEDURE — 93010 ELECTROCARDIOGRAM REPORT: CPT

## 2022-01-06 RX ORDER — SEMAGLUTIDE 0.68 MG/ML
1 INJECTION, SOLUTION SUBCUTANEOUS
Qty: 0 | Refills: 0 | DISCHARGE

## 2022-01-06 NOTE — H&P PST ADULT - NEGATIVE CARDIOVASCULAR SYMPTOMS
no chest pain/no palpitations/no dyspnea on exertion no chest pain/no palpitations/no dyspnea on exertion/no orthopnea/no paroxysmal nocturnal dyspnea/no peripheral edema/no claudication

## 2022-01-06 NOTE — H&P PST ADULT - PRIMARY CARE PROVIDER
Dr Mehta pcp  [phone]                                        Dr Henderson cardiologist  [phone]                      Dr [facility] ;  [phone]

## 2022-01-06 NOTE — H&P PST ADULT - ENDOCRINE COMMENTS
Pt denies thyroid disease, denies diabetes RX metformin , Wegovy ; per pt " for weight loss " taking metformin and saxenda for weight loss

## 2022-01-06 NOTE — H&P PST ADULT - NSICDXPASTSURGICALHX_GEN_ALL_CORE_FT
PAST SURGICAL HISTORY:  H/O hand surgery with implants;  multiple    H/O total shoulder replacement, left 1/2017    S/P carpal tunnel release bilateral    S/P cataract surgery bilateral IOL    S/P epidural steroid injection     S/P TKR (total knee replacement), right 3/2018    Status post D&C 10/19     PAST SURGICAL HISTORY:  H/O hand surgery with implants;  multiple    H/O nasal septoplasty 11/2021    H/O total shoulder replacement, left 1/2017    S/P carpal tunnel release bilateral    S/P cataract surgery bilateral IOL    S/P epidural steroid injection     S/P TKR (total knee replacement), right 3/2018    Status post D&C 10/19

## 2022-01-06 NOTE — H&P PST ADULT - EYES
HOT Hospitalist    Called re: telemetry monitoring.   With recent MI on previous chemotherapy, current afib with flutter (confirmed on monitor and admit EKG), will need cardiac monitoring on cardiology floor if possible for 5FU with risk for arrhythmias. Chemotherapy certified nurses will help with chemotherapy infusion.  Transfer orders placed.     Jordan Koehler MD  [id number]   PERRL/EOMI/conjunctiva clear detailed exam

## 2022-01-06 NOTE — H&P PST ADULT - PROBLEM SELECTOR PLAN 1
Pt scheduled for cranial nerve stimulation generator and lead placement insertion of chest wall respiratory sensor electrode or electrode array including connection to pulse generator on 1/13/2022.  labs done results pending, ekg done.  Pt instructed to obtain preop covid testing.  Preop teaching done, pt able to verbalize understanding.   Pt instructed to obtain medical eval as per surgeon, pst will also request due to multiple comorbidities.   pst request  medical eval Dr. Mehta [phone]  echo 2019 [phone]  stress 2019- [phone] Pt scheduled for cranial nerve stimulation generator and lead placement insertion of chest wall respiratory sensor electrode or electrode array including connection to pulse generator on 1/13/2022.  labs done results pending, ekg done.  Pt instructed to obtain preop covid testing.  Preop teaching done, pt able to verbalize understanding.   Pt instructed to obtain medical eval as per surgeon, pst will also request due to multiple comorbidities.   medications day of procedure- losartan, dexilant  dm last dose of metformin 1/12/2022, no dm medications day of procedure.   pst request  medical eval Dr. Mehta [phone]  echo 2019 [phone]  stress 2019- [phone]

## 2022-01-06 NOTE — H&P PST ADULT - OTHER CARE PROVIDERS
Dr Lomeli Rheumatologist                                          Dr Landers Weight loss doctor ; [phone]

## 2022-01-06 NOTE — H&P PST ADULT - MUSCULOSKELETAL COMMENTS
Right Shoulder . Left knee; s/p  Hyalgan last 9/09/2021 Right Shoulder . Left knee; s/p  Hyalgan every 6 months

## 2022-01-06 NOTE — H&P PST ADULT - GASTROINTESTINAL DETAILS
soft/nontender/bowel sounds normal soft/nontender/no distention/no masses palpable/bowel sounds normal/no bruit/no rebound tenderness/no guarding/no rigidity/no organomegaly

## 2022-01-06 NOTE — H&P PST ADULT - NSICDXPASTMEDICALHX_GEN_ALL_CORE_FT
PAST MEDICAL HISTORY:  Anxiety     Depression     GERD (gastroesophageal reflux disease) Last Endoscopy 2019    Herpes zoster > 10 years ago    Hypercholesterolemia     Hypertension     Lumbar herniated disc     OA (osteoarthritis) pt f/u rheumatologist    Obesity Rx Metformin , saxenda    VAUGHN (obstructive sleep apnea) pt reports  40 lbs weight loss  ; unable to tolerate cpap and not using dental appliance    Spinal stenosis cervical and lumbar; pt to pain management Last MRI 2019    Varicose veins lower extremi     PAST MEDICAL HISTORY:  Anxiety     Depression     GERD (gastroesophageal reflux disease) Last Endoscopy 2019    Herpes zoster > 10 years ago    Hypercholesterolemia     Hypertension     Lumbar herniated disc     OA (osteoarthritis) pt f/u rheumatologist    Obesity Rx Metformin , saxenda    VAUGHN (obstructive sleep apnea)     Spinal stenosis cervical and lumbar; pt to pain management Last MRI 2019    Varicose veins lower extremi

## 2022-01-06 NOTE — H&P PST ADULT - MALLAMPATI CLASS
Limited mobility neck/Class III - visualization of the soft palate and the base of the uvula Class III - visualization of the soft palate and the base of the uvula

## 2022-01-06 NOTE — H&P PST ADULT - HISTORY OF PRESENT ILLNESS
72yo female scheduled for cranial nerve stimulation generator and lead placement insertion of chest wall respiratory sensor electrode or electrode array including connection to pulse generator on 1/13/2022.  Pt states, "hx of sleep apnea unable to tolerate cpap machine."

## 2022-01-06 NOTE — H&P PST ADULT - NEGATIVE GENERAL GENITOURINARY SYMPTOMS
no hematuria/no dysuria no hematuria/no flank pain L/no flank pain R/no bladder infections/no dysuria/normal urinary frequency

## 2022-01-06 NOTE — H&P PST ADULT - NEGATIVE BREAST SYMPTOMS
no breast tenderness L/no breast tenderness R/no nipple discharge L/no nipple discharge R no breast tenderness L/no breast tenderness R/no breast lump L/no breast lump R/no nipple discharge L/no nipple discharge R

## 2022-01-06 NOTE — H&P PST ADULT - NEGATIVE GENERAL SYMPTOMS
no fever/no chills/no weight gain no fever/no chills/no sweating/no anorexia/no weight gain/no polyphagia/no polyuria/no polydipsia/no malaise/no fatigue

## 2022-01-11 PROBLEM — G47.33 OBSTRUCTIVE SLEEP APNEA (ADULT) (PEDIATRIC): Chronic | Status: ACTIVE | Noted: 2019-10-07

## 2022-01-11 PROBLEM — E66.9 OBESITY, UNSPECIFIED: Chronic | Status: ACTIVE | Noted: 2021-09-10

## 2022-01-12 ENCOUNTER — TRANSCRIPTION ENCOUNTER (OUTPATIENT)
Age: 72
End: 2022-01-12

## 2022-01-12 VITALS
OXYGEN SATURATION: 98 % | RESPIRATION RATE: 14 BRPM | HEART RATE: 71 BPM | SYSTOLIC BLOOD PRESSURE: 145 MMHG | WEIGHT: 182.1 LBS | DIASTOLIC BLOOD PRESSURE: 69 MMHG | TEMPERATURE: 98 F | HEIGHT: 63 IN

## 2022-01-12 NOTE — ASU PREOPERATIVE ASSESSMENT, ADULT ([facility] ONLY) - FALL HARM RISK - UNIVERSAL INTERVENTIONS
Bed in lowest position, wheels locked, appropriate side rails in place/Call bell, personal items and telephone in reach/Instruct patient to call for assistance before getting out of bed or chair/Non-slip footwear when patient is out of bed/Kelly to call system/Physically safe environment - no spills, clutter or unnecessary equipment/Purposeful Proactive Rounding/Room/bathroom lighting operational, light cord in reach

## 2022-01-13 ENCOUNTER — RESULT REVIEW (OUTPATIENT)
Age: 72
End: 2022-01-13

## 2022-01-13 ENCOUNTER — OUTPATIENT (OUTPATIENT)
Dept: OUTPATIENT SERVICES | Facility: HOSPITAL | Age: 72
LOS: 1 days | Discharge: HOME | End: 2022-01-13
Payer: MEDICARE

## 2022-01-13 ENCOUNTER — APPOINTMENT (OUTPATIENT)
Dept: OTOLARYNGOLOGY | Facility: AMBULATORY SURGERY CENTER | Age: 72
End: 2022-01-13

## 2022-01-13 VITALS
OXYGEN SATURATION: 100 % | DIASTOLIC BLOOD PRESSURE: 62 MMHG | RESPIRATION RATE: 16 BRPM | HEART RATE: 76 BPM | SYSTOLIC BLOOD PRESSURE: 123 MMHG | TEMPERATURE: 97 F

## 2022-01-13 DIAGNOSIS — Z98.890 OTHER SPECIFIED POSTPROCEDURAL STATES: Chronic | ICD-10-CM

## 2022-01-13 DIAGNOSIS — Z96.651 PRESENCE OF RIGHT ARTIFICIAL KNEE JOINT: Chronic | ICD-10-CM

## 2022-01-13 DIAGNOSIS — Z98.49 CATARACT EXTRACTION STATUS, UNSPECIFIED EYE: Chronic | ICD-10-CM

## 2022-01-13 DIAGNOSIS — G47.33 OBSTRUCTIVE SLEEP APNEA (ADULT) (PEDIATRIC): ICD-10-CM

## 2022-01-13 DIAGNOSIS — Z96.612 PRESENCE OF LEFT ARTIFICIAL SHOULDER JOINT: Chronic | ICD-10-CM

## 2022-01-13 DIAGNOSIS — Z92.241 PERSONAL HISTORY OF SYSTEMIC STEROID THERAPY: Chronic | ICD-10-CM

## 2022-01-13 PROCEDURE — 64568 OPN IMPLTJ CRNL NRV NEA&PG: CPT

## 2022-01-13 PROCEDURE — 71045 X-RAY EXAM CHEST 1 VIEW: CPT | Mod: 26

## 2022-01-13 PROCEDURE — 95970 ALYS NPGT W/O PRGRMG: CPT

## 2022-01-13 DEVICE — INSPIRE IMPLANT PULSE GENERATOR: Type: IMPLANTABLE DEVICE | Status: FUNCTIONAL

## 2022-01-13 DEVICE — INSPIRE STIMULATION LEAD: Type: IMPLANTABLE DEVICE | Status: FUNCTIONAL

## 2022-01-13 DEVICE — REMOTE PATIENT SLEEP: Type: IMPLANTABLE DEVICE | Status: FUNCTIONAL

## 2022-01-13 DEVICE — INSPIRE RESPIRATORY SENSING LEAD: Type: IMPLANTABLE DEVICE | Status: FUNCTIONAL

## 2022-01-13 RX ORDER — SODIUM CHLORIDE 9 MG/ML
1000 INJECTION, SOLUTION INTRAVENOUS
Refills: 0 | Status: DISCONTINUED | OUTPATIENT
Start: 2022-01-13 | End: 2022-01-27

## 2022-01-13 RX ORDER — CYCLOBENZAPRINE HYDROCHLORIDE 10 MG/1
1 TABLET, FILM COATED ORAL
Qty: 0 | Refills: 0 | DISCHARGE

## 2022-01-13 RX ORDER — CEPHALEXIN 500 MG
1 CAPSULE ORAL
Qty: 20 | Refills: 0
Start: 2022-01-13 | End: 2022-01-22

## 2022-01-13 RX ORDER — LOSARTAN POTASSIUM 100 MG/1
1 TABLET, FILM COATED ORAL
Qty: 0 | Refills: 0 | DISCHARGE

## 2022-01-13 RX ORDER — DEXLANSOPRAZOLE 30 MG/1
1 CAPSULE, DELAYED RELEASE ORAL
Qty: 0 | Refills: 0 | DISCHARGE

## 2022-01-13 RX ORDER — METFORMIN HYDROCHLORIDE 850 MG/1
1 TABLET ORAL
Qty: 0 | Refills: 0 | DISCHARGE

## 2022-01-13 RX ORDER — ROSUVASTATIN CALCIUM 5 MG/1
1 TABLET ORAL
Qty: 0 | Refills: 0 | DISCHARGE

## 2022-01-13 RX ORDER — LINACLOTIDE 145 UG/1
1 CAPSULE, GELATIN COATED ORAL
Qty: 0 | Refills: 0 | DISCHARGE

## 2022-01-13 RX ORDER — LIRAGLUTIDE 6 MG/ML
1 INJECTION SUBCUTANEOUS
Qty: 0 | Refills: 0 | DISCHARGE

## 2022-01-13 RX ORDER — NORETHINDRONE AND ETHINYL ESTRADIOL 0.4-0.035
1 KIT ORAL
Qty: 0 | Refills: 0 | DISCHARGE

## 2022-01-13 RX ORDER — DULOXETINE HYDROCHLORIDE 30 MG/1
2 CAPSULE, DELAYED RELEASE ORAL
Qty: 0 | Refills: 0 | DISCHARGE

## 2022-01-13 NOTE — ASU DISCHARGE PLAN (ADULT/PEDIATRIC) - PATIENT BELONGINGS
CAD (coronary artery disease) CAD (coronary artery disease) Dementia Dementia Patient's belongings returned Prophylactic measure

## 2022-01-13 NOTE — ASU DISCHARGE PLAN (ADULT/PEDIATRIC) - CALL YOUR DOCTOR IF YOU HAVE ANY OF THE FOLLOWING:
Bleeding that does not stop/Numbness, tingling, color or temperature change to extremity Bleeding that does not stop/Swelling that gets worse/Pain not relieved by Medications/Fever greater than (need to indicate Fahrenheit or Celsius)/Wound/Surgical Site with redness, or foul smelling discharge or pus/Numbness, tingling, color or temperature change to extremity/Nausea and vomiting that does not stop/Inability to tolerate liquids or foods

## 2022-01-13 NOTE — ASU DISCHARGE PLAN (ADULT/PEDIATRIC) - CARE PROVIDER_API CALL
Von Kent; LORENA)  Otolaryngology  [address] [place] [address] [id number]  Phone: [phone]  Fax: [phone]  Follow Up Time:

## 2022-01-13 NOTE — ASU DISCHARGE PLAN (ADULT/PEDIATRIC) - NS MD DC FALL RISK RISK
For information on Fall & Injury Prevention, visit: [email or web address] OR  [email or web address] OR  https://www.cdc.gov/steadi/patient.html

## 2022-01-19 ENCOUNTER — APPOINTMENT (OUTPATIENT)
Dept: OTOLARYNGOLOGY | Facility: CLINIC | Age: 72
End: 2022-01-19
Payer: MEDICARE

## 2022-01-19 PROCEDURE — 99024 POSTOP FOLLOW-UP VISIT: CPT

## 2022-01-21 NOTE — PHYSICAL EXAM
[de-identified] : neck and  chest incision clean dry intact no edema no erythema no exudates two dressing removed.

## 2022-01-21 NOTE — HISTORY OF PRESENT ILLNESS
[de-identified] : 71 year old female pt healing well. Pt states there is little  to no pain, denies drainage or swelling at incision. Pt admits to restricting arm for 5 days.\par

## 2022-02-09 ENCOUNTER — APPOINTMENT (OUTPATIENT)
Dept: OBGYN | Facility: CLINIC | Age: 72
End: 2022-02-09
Payer: MEDICARE

## 2022-02-09 ENCOUNTER — APPOINTMENT (OUTPATIENT)
Dept: OTOLARYNGOLOGY | Facility: CLINIC | Age: 72
End: 2022-02-09
Payer: MEDICARE

## 2022-02-09 ENCOUNTER — RX CHANGE (OUTPATIENT)
Age: 72
End: 2022-02-09

## 2022-02-09 VITALS
SYSTOLIC BLOOD PRESSURE: 164 MMHG | BODY MASS INDEX: 30.49 KG/M2 | WEIGHT: 183 LBS | HEIGHT: 65 IN | DIASTOLIC BLOOD PRESSURE: 78 MMHG | HEART RATE: 99 BPM

## 2022-02-09 VITALS
HEIGHT: 65 IN | DIASTOLIC BLOOD PRESSURE: 76 MMHG | SYSTOLIC BLOOD PRESSURE: 132 MMHG | BODY MASS INDEX: 30.49 KG/M2 | WEIGHT: 183 LBS

## 2022-02-09 DIAGNOSIS — N85.01 BENIGN ENDOMETRIAL HYPERPLASIA: ICD-10-CM

## 2022-02-09 DIAGNOSIS — Z12.11 ENCOUNTER FOR SCREENING FOR MALIGNANT NEOPLASM OF COLON: ICD-10-CM

## 2022-02-09 DIAGNOSIS — Z48.02 ENCOUNTER FOR CHANGE OR REMOVAL OF SURGICAL WOUND DRESSING: ICD-10-CM

## 2022-02-09 DIAGNOSIS — Z48.01 ENCOUNTER FOR CHANGE OR REMOVAL OF SURGICAL WOUND DRESSING: ICD-10-CM

## 2022-02-09 DIAGNOSIS — E66.9 OBESITY, UNSPECIFIED: ICD-10-CM

## 2022-02-09 DIAGNOSIS — G47.30 SLEEP APNEA, UNSPECIFIED: ICD-10-CM

## 2022-02-09 PROCEDURE — 99214 OFFICE O/P EST MOD 30 MIN: CPT | Mod: 25

## 2022-02-09 PROCEDURE — 99214 OFFICE O/P EST MOD 30 MIN: CPT

## 2022-02-09 RX ORDER — NORETHINDRONE ACETATE 5 MG/1
5 TABLET ORAL DAILY
Qty: 1 | Refills: 3 | Status: COMPLETED | COMMUNITY
Start: 2022-02-09 | End: 2022-02-09

## 2022-02-09 RX ORDER — ESTRADIOL 0.5 MG/1
0.5 TABLET ORAL
Qty: 90 | Refills: 3 | Status: COMPLETED | COMMUNITY
Start: 2022-02-09 | End: 2022-02-09

## 2022-02-09 NOTE — HISTORY OF PRESENT ILLNESS
[de-identified] : 71 year old female follow up s/p   Inspire 12th cranial nerve (hypoglossal) stimulation implant with 360degree dissection of the hypoglossal nerve along with placement of the right pleural respiration sensor, electronic analysis of the implanted neurostimulator  pulse generator system 1/13/22.  States 2/5/22 was brushing her hair and felt a tug.  Noticed a thread coming from the incision site.

## 2022-02-09 NOTE — HISTORY OF PRESENT ILLNESS
[postmenopausal] : postmenopausal [N] : Patient does not use contraception [No] : Patient does not have concerns regarding sex [Mammogramdate] : 03/2021 [TextBox_19] : BR2 [PapSmeardate] : 03/2021 [TextBox_31] : NEGATIVE [BoneDensityDate] : 06/2021 [LMPDate] : 2005 [FreeTextEntry1] : 2005

## 2022-02-09 NOTE — PHYSICAL EXAM
[Appropriately responsive] : appropriately responsive [Alert] : alert [No Acute Distress] : no acute distress [Soft] : soft [Non-tender] : non-tender [Non-distended] : non-distended [No HSM] : No HSM [No Lesions] : no lesions [No Mass] : no mass [Oriented x3] : oriented x3 [FreeTextEntry3] : HEALED IHORIZONTAL INCISION TO RIGHT OF MIDILINE IN NECK [FreeTextEntry6] : PALPABLE TIP OF INSPIRE DEVICE AT RIGHT BREAST MEDIALLY [Examination Of The Breasts] : a normal appearance [No Masses] : no breast masses were palpable [Labia Majora] : normal [Labia Minora] : normal [Normal] : normal [Uterine Adnexae] : normal [No Tenderness] : no tenderness [Nl Sphincter Tone] : normal sphincter tone [FreeTextEntry9] : GUAIAC NEGATIVE

## 2022-02-09 NOTE — PLAN
[FreeTextEntry1] : Risks and benefits of hormone therapy were discussed with the patient including both short-term and long-term risks of heart disease and breast cancer. Family history and lifestyle choices were reviewed in light of the risks and benefits of hormone therapy, including contribution to bone stability and reduction of colon cancer.\par START TAPER.\par \par

## 2022-02-09 NOTE — CONSULT LETTER
[Dear  ___] : Dear  [unfilled], [Courtesy Letter:] : I had the pleasure of seeing your patient, [unfilled], in my office today. [Please see my note below.] : Please see my note below. [Sincerely,] : Sincerely, [FreeTextEntry2] : Bridgett Mehta [FreeTextEntry3] : Von Kent MD, LORENA, FACS\par  Department Otolaryngology\par Director of [facility]\par Professor of Otolaryngology, \par [employer]\par

## 2022-02-09 NOTE — REASON FOR VISIT
[Subsequent Evaluation] : a subsequent evaluation for [FreeTextEntry2] : follow up s/p   Inspire 12th cranial nerve (hypoglossal) stimulation implant with 360degree dissection of the hypoglossal nerve along with placement of the right pleural respiration sensor, electronic analysis of the implanted neurostimulator  pulse generator system 1/13/22

## 2022-02-09 NOTE — PHYSICAL EXAM
[FreeTextEntry1] : VAUGHN, obesity, something sticking out in neck [de-identified] : Clear Monocryl suture extending on one side of the wound [Midline] : trachea located in midline position [Normal] : no rashes

## 2022-02-09 NOTE — PROCEDURE
[FreeTextEntry1] : suture removal [FreeTextEntry2] : Monocryl suture sticking out of the wound [FreeTextEntry3] : Patient placed exam care. End of suture was grasped with 2 her as much as possible from the incision without disruption the excess suture was then trimmed at the level of the skin suture line completely intact

## 2022-02-15 ENCOUNTER — APPOINTMENT (OUTPATIENT)
Dept: PULMONOLOGY | Facility: CLINIC | Age: 72
End: 2022-02-15
Payer: MEDICARE

## 2022-02-15 VITALS
OXYGEN SATURATION: 95 % | DIASTOLIC BLOOD PRESSURE: 88 MMHG | TEMPERATURE: 97 F | HEIGHT: 65 IN | SYSTOLIC BLOOD PRESSURE: 128 MMHG | RESPIRATION RATE: 15 BRPM | WEIGHT: 180 LBS | BODY MASS INDEX: 29.99 KG/M2 | HEART RATE: 109 BPM

## 2022-02-15 PROCEDURE — 99213 OFFICE O/P EST LOW 20 MIN: CPT | Mod: GC

## 2022-02-16 NOTE — HISTORY OF PRESENT ILLNESS
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 Pt. is complaining of right sided facial pain and right retro-orbital headache lasting for few days after the surgery which has since resolved but still has a intermittent right ear ache.  Describes difficulty articulating certain words starting with letter "S" and "F". she also reports some limitation to tongue mobility to the right. Otherwise denies any other complaints.   [Nocturnal Oxygen] : The patient does not use nocturnal oxygen

## 2022-02-16 NOTE — PHYSICAL EXAM
[General Appearance - Well Developed] : well developed [General Appearance - In No Acute Distress] : no acute distress [Heart Sounds] : normal S1 and S2 [Auscultation Breath Sounds / Voice Sounds] : lungs were clear to auscultation bilaterally [Abdomen Soft] : soft [Abdomen Tenderness] : non-tender [Normal Conjunctiva] : the conjunctiva exhibited no abnormalities [Eyelids - No Xanthelasma] : the eyelids demonstrated no xanthelasmas [FreeTextEntry1] : surgical scar right upper anterior neck which looks normal  [Deep Tendon Reflexes (DTR)] : deep tendon reflexes were 2+ and symmetric [Sensation] : the sensory exam was normal to light touch and pinprick [No Focal Deficits] : no focal deficits [Oriented To Time, Place, And Person] : oriented to person, place, and time [Impaired Insight] : insight and judgment were intact [Affect] : the affect was normal

## 2022-02-16 NOTE — ASSESSMENT
[FreeTextEntry1] : 71 year old female with a history of severe obstructive sleep apnea coming to sleep clinic after Inspire device implantation on 1/13/2022 and complaining of signs and symptoms of hypoglossal nerve neurapraxia improved but still present one month after implantation of the inspire device. \par \par Plan: \par Pt. was educated in detail about her symptoms and she understands that her symptoms may occur after Inspire device implantation due to inflammation in the region of electrode placement.  she was also told that these symptoms generally resolve within 1-6 months after surgery.\par Pt was  advised to follow up in 2 weeks for symptom monitoring\par Will not activate the device at this time due to residual neurapraxia which is improving; this will allow better nerve healing as per standard practice. Once sx's are resolved will then proceed with inspire device programming and use. Pt expressed understanding and will f/u in 2 weeks and will call if there are any new or worsening sx's.

## 2022-03-08 ENCOUNTER — APPOINTMENT (OUTPATIENT)
Dept: ANTEPARTUM | Facility: CLINIC | Age: 72
End: 2022-03-08
Payer: MEDICARE

## 2022-03-08 ENCOUNTER — ASOB RESULT (OUTPATIENT)
Age: 72
End: 2022-03-08

## 2022-03-08 PROCEDURE — 76830 TRANSVAGINAL US NON-OB: CPT

## 2022-03-08 PROCEDURE — 76857 US EXAM PELVIC LIMITED: CPT | Mod: 59

## 2022-03-09 ENCOUNTER — NON-APPOINTMENT (OUTPATIENT)
Age: 72
End: 2022-03-09

## 2022-03-14 ENCOUNTER — APPOINTMENT (OUTPATIENT)
Dept: PULMONOLOGY | Facility: CLINIC | Age: 72
End: 2022-03-14
Payer: MEDICARE

## 2022-03-14 VITALS
RESPIRATION RATE: 16 BRPM | BODY MASS INDEX: 31.05 KG/M2 | DIASTOLIC BLOOD PRESSURE: 89 MMHG | SYSTOLIC BLOOD PRESSURE: 148 MMHG | OXYGEN SATURATION: 97 % | HEART RATE: 98 BPM | WEIGHT: 186.38 LBS | TEMPERATURE: 97.3 F | HEIGHT: 65 IN

## 2022-03-14 PROCEDURE — 99214 OFFICE O/P EST MOD 30 MIN: CPT | Mod: 25

## 2022-03-14 PROCEDURE — 95976 ALYS SMPL CN NPGT PRGRMG: CPT | Mod: GC

## 2022-03-15 NOTE — HISTORY OF PRESENT ILLNESS
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 She was last seen on 2/15/22 but at the time had neuropraxia (difficulty with speech) and intermittent right ear ache, for which activation was delayed. Today she presents with marked improvement of these symptoms and has no other issues at this point.\par \par HST 6/1/2021 - LELE 50.9/h with T90 22%\par PSG 3/2016 at [facility] AHI = 55.3\par  [Nocturnal Oxygen] : The patient does not use nocturnal oxygen

## 2022-03-15 NOTE — PHYSICAL EXAM
[General Appearance - Well Developed] : well developed [General Appearance - In No Acute Distress] : no acute distress [Normal Conjunctiva] : the conjunctiva exhibited no abnormalities [Eyelids - No Xanthelasma] : the eyelids demonstrated no xanthelasmas [Heart Sounds] : normal S1 and S2 [Auscultation Breath Sounds / Voice Sounds] : lungs were clear to auscultation bilaterally [Abdomen Soft] : soft [Abdomen Tenderness] : non-tender [Deep Tendon Reflexes (DTR)] : deep tendon reflexes were 2+ and symmetric [Sensation] : the sensory exam was normal to light touch and pinprick [No Focal Deficits] : no focal deficits [Oriented To Time, Place, And Person] : oriented to person, place, and time [Impaired Insight] : insight and judgment were intact [Affect] : the affect was normal [FreeTextEntry1] : surgical scar right upper anterior neck which looks normal

## 2022-03-15 NOTE — ASSESSMENT
[FreeTextEntry1] : 71 year old female with a history of severe obstructive sleep apnea coming to sleep clinic after Inspire device implantation on 1/13/2022. Currently symptoms that she developed after implantation (neuropraxia) have improved and device was activated at the following settings:\par \par Settings:\par Sensory threshold 1.2V\par Functional threshold 1.2V\par \par - Voltage: 1.4-2.4V (1.5V)\par - Electrode: +-+\par - Start delay: 30mins\par - Pause time: 15mins \par - Therapy duration: 9hours\par - Pulse Width: 90msec\par - Rate: 33Hz\par \par - Ultrasound evaluation of tongue motion showed appropriate anterior excursion of the hyoid bone on 1.5V and electrode configuration +-+, with good patient tolerance\par - Patient was explained how to use the device and how he will be titrating the device independently, patient understood. Counseling has also been provided on avoidance of MRI of chest and abdomen. \par - Will follow up with patient in 3 weeks time (4/4/22 at 1:00pm tentatively). Repeat HST to assess efficacy to be ordered then.

## 2022-03-28 ENCOUNTER — RX RENEWAL (OUTPATIENT)
Age: 72
End: 2022-03-28

## 2022-04-05 ENCOUNTER — APPOINTMENT (OUTPATIENT)
Dept: PULMONOLOGY | Facility: CLINIC | Age: 72
End: 2022-04-05
Payer: MEDICARE

## 2022-04-05 VITALS
RESPIRATION RATE: 16 BRPM | WEIGHT: 186 LBS | TEMPERATURE: 98.7 F | HEART RATE: 78 BPM | BODY MASS INDEX: 30.99 KG/M2 | DIASTOLIC BLOOD PRESSURE: 89 MMHG | HEIGHT: 65 IN | SYSTOLIC BLOOD PRESSURE: 150 MMHG

## 2022-04-05 PROCEDURE — 95976 ALYS SMPL CN NPGT PRGRMG: CPT | Mod: GC

## 2022-04-05 PROCEDURE — 99213 OFFICE O/P EST LOW 20 MIN: CPT | Mod: GC,25

## 2022-04-05 NOTE — ASSESSMENT
[FreeTextEntry1] : 72 year old female with a history of severe obstructive sleep apnea f/u for inspire. Although her symptoms have improved compared to no treatment, she is having difficulty tolerating current electrode settings at higher voltages. Device impedance and sensing are both within normal limits and multiple electrode configurations were tried. Best results were obtained on the following settings:\par \par Settings:\par Sensory threshold 1.2V\par Functional threshold 1.2V\par \par - Voltage: 1.4-2.4v ->0.5-1.5V (0.6V)\par - Electrode: +-+ -> - - - \par - Start delay: 30mins\par - Pause time: 15mins \par - Therapy duration: 9hours\par - Pulse Width: 90msec -> 120msec\par - Rate: 33Hz\par \par - Ultrasound evaluation of tongue motion showed appropriate anterior excursion of the hyoid bone on 0.6V and electrode configuration - - -, with good patient tolerance\par - Will follow up with patient in 2-3 weeks time. If her sleep symptoms have shown continued improvement at that time, we will repeat an HST to assess objective efficacy.

## 2022-04-05 NOTE — HISTORY OF PRESENT ILLNESS
[Obstructive Sleep Apnea] : obstructive sleep apnea [Date: ___] : Date of most recent diagnostic polysomnogram: [unfilled] [AHI: ___ per hour] : Apnea-hypopnea index:  [unfilled] per hour [T90%: ___] : T90%: [unfilled]% [Lc desatuation%: ___] : Lc desaturation:  [unfilled]% [FreeTextEntry1] : 71 yo F with severe VAUGHN intolerant of CPAP presenting for follow up after Inspire device implantation (1/13/22) and activation (3/14/22). Activation was delayed due to persistent neuropraxia and intermittent right ear ache. After activation, she was initially able to progress level 4 (1.7V) with improvement of her EDS. However, she developed tongue and right ear ache after a week of being on this voltage and had brought it down to 1.5V. She still has overall improvement in her symptoms, but with some residual daytime sleepiness.\par \par HST 6/1/2021 - LELE 50.9/h with T90 22%\par PSG 3/2016 at [facility] AHI = 55.3\par  [Nocturnal Oxygen] : The patient does not use nocturnal oxygen

## 2022-05-20 ENCOUNTER — APPOINTMENT (OUTPATIENT)
Dept: SLEEP CENTER | Facility: CLINIC | Age: 72
End: 2022-05-20
Payer: MEDICARE

## 2022-05-20 ENCOUNTER — OUTPATIENT (OUTPATIENT)
Dept: OUTPATIENT SERVICES | Facility: HOSPITAL | Age: 72
LOS: 1 days | End: 2022-05-20
Payer: MEDICARE

## 2022-05-20 ENCOUNTER — OUTPATIENT (OUTPATIENT)
Dept: OUTPATIENT SERVICES | Facility: HOSPITAL | Age: 72
LOS: 1 days | End: 2022-05-20

## 2022-05-20 DIAGNOSIS — Z92.241 PERSONAL HISTORY OF SYSTEMIC STEROID THERAPY: Chronic | ICD-10-CM

## 2022-05-20 DIAGNOSIS — Z96.651 PRESENCE OF RIGHT ARTIFICIAL KNEE JOINT: Chronic | ICD-10-CM

## 2022-05-20 DIAGNOSIS — Z98.890 OTHER SPECIFIED POSTPROCEDURAL STATES: Chronic | ICD-10-CM

## 2022-05-20 DIAGNOSIS — Z98.49 CATARACT EXTRACTION STATUS, UNSPECIFIED EYE: Chronic | ICD-10-CM

## 2022-05-20 DIAGNOSIS — Z96.612 PRESENCE OF LEFT ARTIFICIAL SHOULDER JOINT: Chronic | ICD-10-CM

## 2022-05-20 PROCEDURE — 95806 SLEEP STUDY UNATT&RESP EFFT: CPT

## 2022-05-20 PROCEDURE — 95806 SLEEP STUDY UNATT&RESP EFFT: CPT | Mod: 26

## 2022-06-24 DIAGNOSIS — G47.33 OBSTRUCTIVE SLEEP APNEA (ADULT) (PEDIATRIC): ICD-10-CM

## 2022-07-15 DIAGNOSIS — G47.33 OBSTRUCTIVE SLEEP APNEA (ADULT) (PEDIATRIC): ICD-10-CM

## 2022-10-13 NOTE — ASU PREOPERATIVE ASSESSMENT, ADULT (IPARK ONLY) - HEIGHT IN CM
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 
160.02

## 2023-06-13 ENCOUNTER — RX ONLY (RX ONLY)
Age: 73
End: 2023-06-13

## 2023-07-17 ENCOUNTER — OFFICE (OUTPATIENT)
Facility: LOCATION | Age: 73
Setting detail: OPHTHALMOLOGY
End: 2023-07-17
Payer: MEDICARE

## 2023-07-17 ENCOUNTER — RX ONLY (RX ONLY)
Age: 73
End: 2023-07-17

## 2023-07-17 DIAGNOSIS — H01.115: ICD-10-CM

## 2023-07-17 DIAGNOSIS — H52.4: ICD-10-CM

## 2023-07-17 DIAGNOSIS — H01.114: ICD-10-CM

## 2023-07-17 DIAGNOSIS — H01.111: ICD-10-CM

## 2023-07-17 DIAGNOSIS — H01.112: ICD-10-CM

## 2023-07-17 PROBLEM — H16.223 DRY EYE SYNDROME K SICCA; BOTH EYES: Status: ACTIVE | Noted: 2023-07-17

## 2023-07-17 PROBLEM — H02.83 DERMATOCHALASIS ; BOTH EYES: Status: ACTIVE | Noted: 2023-07-17

## 2023-07-17 PROBLEM — H18.513 ENDOTHELIAL CORNEAL DYSTROPHY; BOTH EYES: Status: ACTIVE | Noted: 2023-07-17

## 2023-07-17 PROCEDURE — 99213 OFFICE O/P EST LOW 20 MIN: CPT | Performed by: OPHTHALMOLOGY

## 2023-07-17 PROCEDURE — 92015 DETERMINE REFRACTIVE STATE: CPT | Performed by: OPHTHALMOLOGY

## 2023-07-17 ASSESSMENT — SUPERFICIAL PUNCTATE KERATITIS (SPK)
OD_SPK: 1+
OS_SPK: 1+

## 2023-07-17 ASSESSMENT — LID EXAM ASSESSMENTS
OS_DERMATOCHALASIS: 1+
OS_ANGULAR_BLEPHARITIS: 1+
OD_DERMATOCHALASIS: 1+
OD_ANGULAR_BLEPHARITIS: 1+

## 2023-07-17 ASSESSMENT — CONFRONTATIONAL VISUAL FIELD TEST (CVF)
OS_FINDINGS: FULL
OD_FINDINGS: FULL

## 2023-07-17 ASSESSMENT — TONOMETRY
OD_IOP_MMHG: 12
OS_IOP_MMHG: 14

## 2023-07-17 ASSESSMENT — CORNEAL DYSTROPHY - POSTERIOR
OD_POSTERIORDYSTROPHY: T GUTTATA
OS_POSTERIORDYSTROPHY: T GUTTATA

## 2023-07-21 NOTE — ASU PATIENT PROFILE, ADULT - NS TRANSFER HEARING AID
n/a

## 2023-07-28 PROBLEM — H52.4 PRESBYOPIA: Status: ACTIVE | Noted: 2023-07-17

## 2023-07-28 PROBLEM — H01.114: Status: ACTIVE | Noted: 2023-07-17

## 2023-07-28 PROBLEM — Z96.1 PSEUDOPHAKIA ; BOTH EYES: Status: ACTIVE | Noted: 2023-07-17

## 2023-07-28 PROBLEM — H01.115: Status: ACTIVE | Noted: 2023-07-17

## 2023-07-28 PROBLEM — H01.111: Status: ACTIVE | Noted: 2023-07-17

## 2023-07-28 PROBLEM — H01.112: Status: ACTIVE | Noted: 2023-07-17

## 2023-07-28 ASSESSMENT — REFRACTION_MANIFEST
OD_CYLINDER: +0.50
OU_VA: 20/20
OS_VA2: 20/20(J1+)
OD_AXIS: 170
OD_VA1: 20/20
OS_ADD: +2.50
OS_SPHERE: PLANO
OD_VA2: 20/20(J1+)
OD_ADD: +2.50
OS_VA1: 20/20-2
OS_CYLINDER: SPHERE
OD_SPHERE: +0.25

## 2023-07-28 ASSESSMENT — VISUAL ACUITY
OS_BCVA: 20/25-1
OD_BCVA: 20/25-1

## 2023-07-28 ASSESSMENT — REFRACTION_CURRENTRX
OS_OVR_VA: 20/
OS_CYLINDER: SPHERE
OD_SPHERE: +2.50
OD_OVR_VA: 20/
OS_SPHERE: +2.50
OD_CYLINDER: SPHERE

## 2023-07-28 ASSESSMENT — SPHEQUIV_DERIVED: OD_SPHEQUIV: 0.5

## 2023-08-01 ENCOUNTER — APPOINTMENT (OUTPATIENT)
Dept: OBGYN | Facility: CLINIC | Age: 73
End: 2023-08-01
Payer: MEDICARE

## 2023-08-01 PROCEDURE — 99213 OFFICE O/P EST LOW 20 MIN: CPT | Mod: 95

## 2023-08-03 NOTE — PLAN
[FreeTextEntry1] : MENOPAUSAL SYMPTOMS OFF HT, WILL TRIAL VEOZHA. CLOSE TERM F/U OF LFTs AND SYMPTOMS.

## 2023-08-03 NOTE — HISTORY OF PRESENT ILLNESS
[FreeTextEntry1] : This visit was provided via telehealth using real-time 2-way audio visual technology. The patient,was located at       HOME                        at the time of the visit.  The provider, JULIUS SCHUMACHER, was located at               HOME                  at the time of the visit. The patient and Provider participated in the telehealth encounter. The patient gave consent.  Patient C/O THAT HER "BODY IS REBELLING." WITH HOT FLASHES AND NIGHT SWEATS.  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  WAS IN [place].  HAS HAD CONTINUED WEIGHT LOSS, NOW #.  USES SLEEP APNEA DEVICE.  LAST DOSE OF HT WAS 1/2023, HAD WEANED OFF SAME.  Extensive discussion with patient regarding menopausal symptoms and relief of them. Discussed diet in particular, low carbohydrate diet, exercise, in particular aerobic exercise and use of vitamins and supplements. Use of hormone replacement therapy and low-dose antidepressants for symptom relief PATIENT WANTS TO TRIAL FEXOLINEDANT.  .SHE UNDERSTANDS THAT THIS IS A NEW MEDICATION WITHOUT LONG TERM FOLLOW UP DATA REGARDING SIDE EFFECTS.

## 2023-08-21 ENCOUNTER — NON-APPOINTMENT (OUTPATIENT)
Age: 73
End: 2023-08-21

## 2023-09-05 ENCOUNTER — APPOINTMENT (OUTPATIENT)
Dept: PULMONOLOGY | Facility: CLINIC | Age: 73
End: 2023-09-05
Payer: MEDICARE

## 2023-09-05 VITALS
WEIGHT: 156.8 LBS | SYSTOLIC BLOOD PRESSURE: 147 MMHG | OXYGEN SATURATION: 98 % | DIASTOLIC BLOOD PRESSURE: 78 MMHG | HEART RATE: 73 BPM | RESPIRATION RATE: 16 BRPM | TEMPERATURE: 97.6 F | HEIGHT: 65 IN | BODY MASS INDEX: 26.12 KG/M2

## 2023-09-05 DIAGNOSIS — G47.33 OBSTRUCTIVE SLEEP APNEA (ADULT) (PEDIATRIC): ICD-10-CM

## 2023-09-05 PROCEDURE — 99214 OFFICE O/P EST MOD 30 MIN: CPT | Mod: 25

## 2023-09-05 PROCEDURE — 95976 ALYS SMPL CN NPGT PRGRMG: CPT

## 2023-09-05 RX ORDER — LIRAGLUTIDE 6 MG/ML
18 INJECTION, SOLUTION SUBCUTANEOUS
Refills: 0 | Status: DISCONTINUED | COMMUNITY
End: 2023-09-05

## 2023-09-05 NOTE — PHYSICAL EXAM
[General Appearance - Well Developed] : well developed [General Appearance - Well Nourished] : well nourished [Neck Appearance] : the appearance of the neck was normal [Heart Rate And Rhythm] : heart rate was normal and rhythm regular [Heart Sounds] : normal S1 and S2 [Exaggerated Use Of Accessory Muscles For Inspiration] : no accessory muscle use [Auscultation Breath Sounds / Voice Sounds] : lungs were clear to auscultation bilaterally [Abnormal Walk] : normal gait [Abdomen Soft] : soft [Nail Clubbing] : no clubbing of the fingernails [Cyanosis, Localized] : no localized cyanosis [] : no rash [No Focal Deficits] : no focal deficits [Oriented To Time, Place, And Person] : oriented to person, place, and time [Impaired Insight] : insight and judgment were intact

## 2023-09-06 PROBLEM — G47.33 OBSTRUCTIVE SLEEP APNEA, ADULT: Status: ACTIVE | Noted: 2021-04-28

## 2023-09-06 NOTE — REVIEW OF SYSTEMS
[Heartburn] : heartburn [EDS: ESS=____] : no daytime somnolence [Fatigue] : no fatigue [Shortness Of Breath] : no shortness of breath [Chest Pain] : no chest pain [Difficulty Initiating Sleep] : no difficulty falling asleep [Difficulty Maintaining Sleep] : no difficulty maintaining sleep

## 2023-09-06 NOTE — HISTORY OF PRESENT ILLNESS
[Frequent Nocturnal Awakening] : frequent nocturnal awakening [FreeTextEntry1] : 74 yo F severe VAUGHN intolerant of CPAP s/p inspire (implanted 1/13/22, activated 3/14/22) c/b post implantation neuropraxia presenting for follow up of VAUGHN.   Last seen in April 2022. HST around that time with improvement in LELE though still at moderate severity. Recommended to increase therapy and states that on level 4 experiences discomfort with his tongue movement. Denies nocturnal awakenings from the device though does report persistent discomfort with awakening that occurs daily.   Goes to bed between 11pm-12am without significant difficulty falling asleep (occasionally will take longer than programmed 25minutes). Wakes up 9-930am. Has approximately 3 nocturnal awakenings a night to use the bathroom. Denies significant daytime somnolence or lack of energy.  Diagnostic studies:  HST on inspire 5/20/22: LELE 23.7 T90 4.5% HST 6/1/2021 - LELE 50.9/h with T90 22% PSG 3/2016 at [facility] AHI = 55.3 [Recent  Weight Gain] : no recent weight gain [Daytime Somnolence] : no daytime somnolence [DIS] : no DIS [DMS] : no DMS

## 2023-09-06 NOTE — ASSESSMENT
[FreeTextEntry1] : 74 yo F severe VAUGHN intolerant of CPAP s/p inspire (implanted 1/13/22, activated 3/14/22) c/b post implantation neuropraxia presenting for follow up of VAUGHN. She is not experiencing significant EDS or daytime symptoms and her nocturnal awakenings are related to nocturia rather than respiratory related symptoms. However, she notes discomfort, particularly in the morning, despite therapy cessation. In addition, she has lost approximately 30 lbs since last HST which may have reduced her voltage needs. She is instructed to hold therapy for 3 days to allow for nerve recovery. She will restart therapy at a lower level and repeat HST in 2-3 weeks to evaluate the severity of residual sleep disordered breathing.   Impedance testing was performed in clinic today. Device was adjusted as follows:   - Voltage:  0.5 - 1.5 (0.7V) -> 0.4 -1.4 (0.6V) - Start delay:  30mins -> 45mins - Pause time:  15mins - Therapy duration:  9hours - Pulse Width:  120msec - Electrode: --- - Rate:  33Hz  RTC post HST

## 2023-09-08 ENCOUNTER — NON-APPOINTMENT (OUTPATIENT)
Age: 73
End: 2023-09-08

## 2023-09-14 ENCOUNTER — NON-APPOINTMENT (OUTPATIENT)
Age: 73
End: 2023-09-14

## 2023-09-14 ENCOUNTER — RX ONLY (RX ONLY)
Age: 73
End: 2023-09-14

## 2023-09-14 ENCOUNTER — OFFICE (OUTPATIENT)
Facility: LOCATION | Age: 73
Setting detail: OPHTHALMOLOGY
End: 2023-09-14
Payer: MEDICARE

## 2023-09-14 DIAGNOSIS — H16.223: ICD-10-CM

## 2023-09-14 DIAGNOSIS — H01.115: ICD-10-CM

## 2023-09-14 DIAGNOSIS — H01.111: ICD-10-CM

## 2023-09-14 DIAGNOSIS — H01.112: ICD-10-CM

## 2023-09-14 DIAGNOSIS — H01.114: ICD-10-CM

## 2023-09-14 PROCEDURE — 99213 OFFICE O/P EST LOW 20 MIN: CPT | Performed by: OPHTHALMOLOGY

## 2023-09-14 ASSESSMENT — LID EXAM ASSESSMENTS
OD_ANGULAR_BLEPHARITIS: 1+
OS_DERMATOCHALASIS: 1+
OS_ANGULAR_BLEPHARITIS: 1+
OD_DERMATOCHALASIS: 1+

## 2023-09-14 ASSESSMENT — TONOMETRY
OD_IOP_MMHG: 14
OS_IOP_MMHG: 14

## 2023-09-14 ASSESSMENT — SUPERFICIAL PUNCTATE KERATITIS (SPK)
OS_SPK: 1+
OD_SPK: 1+

## 2023-09-14 ASSESSMENT — CONFRONTATIONAL VISUAL FIELD TEST (CVF)
OS_FINDINGS: FULL
OD_FINDINGS: FULL

## 2023-09-14 ASSESSMENT — CORNEAL DYSTROPHY - POSTERIOR
OD_POSTERIORDYSTROPHY: T GUTTATA
OS_POSTERIORDYSTROPHY: T GUTTATA

## 2023-09-15 ASSESSMENT — REFRACTION_MANIFEST
OS_VA1: 20/20-2
OD_ADD: +2.50
OD_VA2: 20/20(J1+)
OS_CYLINDER: SPHERE
OD_AXIS: 170
OD_CYLINDER: +0.50
OD_VA1: 20/20
OS_VA2: 20/20(J1+)
OD_SPHERE: +0.25
OS_SPHERE: PLANO
OS_ADD: +2.50
OU_VA: 20/20

## 2023-09-15 ASSESSMENT — REFRACTION_CURRENTRX
OD_OVR_VA: 20/
OS_SPHERE: +2.50
OD_SPHERE: +2.50
OS_CYLINDER: SPHERE
OD_CYLINDER: SPHERE
OS_OVR_VA: 20/

## 2023-09-15 ASSESSMENT — VISUAL ACUITY
OS_BCVA: 20/25-1
OD_BCVA: 20/20

## 2023-09-15 ASSESSMENT — SPHEQUIV_DERIVED: OD_SPHEQUIV: 0.5

## 2023-09-26 ENCOUNTER — OUTPATIENT (OUTPATIENT)
Dept: OUTPATIENT SERVICES | Facility: HOSPITAL | Age: 73
LOS: 1 days | End: 2023-09-26
Payer: MEDICARE

## 2023-09-26 DIAGNOSIS — Z98.890 OTHER SPECIFIED POSTPROCEDURAL STATES: Chronic | ICD-10-CM

## 2023-09-26 DIAGNOSIS — Z96.651 PRESENCE OF RIGHT ARTIFICIAL KNEE JOINT: Chronic | ICD-10-CM

## 2023-09-26 DIAGNOSIS — Z96.612 PRESENCE OF LEFT ARTIFICIAL SHOULDER JOINT: Chronic | ICD-10-CM

## 2023-09-26 DIAGNOSIS — Z98.49 CATARACT EXTRACTION STATUS, UNSPECIFIED EYE: Chronic | ICD-10-CM

## 2023-09-26 DIAGNOSIS — Z92.241 PERSONAL HISTORY OF SYSTEMIC STEROID THERAPY: Chronic | ICD-10-CM

## 2023-09-26 DIAGNOSIS — G47.33 OBSTRUCTIVE SLEEP APNEA (ADULT) (PEDIATRIC): ICD-10-CM

## 2023-09-26 PROCEDURE — G0400: CPT | Mod: 26

## 2023-09-26 PROCEDURE — 95800 SLP STDY UNATTENDED: CPT

## 2023-10-02 ENCOUNTER — NON-APPOINTMENT (OUTPATIENT)
Age: 73
End: 2023-10-02

## 2023-10-24 ENCOUNTER — NON-APPOINTMENT (OUTPATIENT)
Age: 73
End: 2023-10-24

## 2023-10-24 ENCOUNTER — APPOINTMENT (OUTPATIENT)
Dept: OBGYN | Facility: CLINIC | Age: 73
End: 2023-10-24
Payer: MEDICARE

## 2023-10-24 VITALS
SYSTOLIC BLOOD PRESSURE: 130 MMHG | BODY MASS INDEX: 26.58 KG/M2 | HEIGHT: 63 IN | DIASTOLIC BLOOD PRESSURE: 72 MMHG | WEIGHT: 150 LBS

## 2023-10-24 DIAGNOSIS — M81.0 AGE-RELATED OSTEOPOROSIS W/OUT CURRENT PATHOLOGICAL FRACTURE: ICD-10-CM

## 2023-10-24 DIAGNOSIS — N95.1 MENOPAUSAL AND FEMALE CLIMACTERIC STATES: ICD-10-CM

## 2023-10-24 DIAGNOSIS — Z12.39 ENCOUNTER FOR OTHER SCREENING FOR MALIGNANT NEOPLASM OF BREAST: ICD-10-CM

## 2023-10-24 PROCEDURE — 99214 OFFICE O/P EST MOD 30 MIN: CPT

## 2023-10-25 RX ORDER — FEZOLINETANT 45 MG/1
45 TABLET, FILM COATED ORAL
Qty: 90 | Refills: 3 | Status: ACTIVE | COMMUNITY
Start: 2023-08-01 | End: 1900-01-01

## 2023-12-14 ENCOUNTER — OFFICE (OUTPATIENT)
Facility: LOCATION | Age: 73
Setting detail: OPHTHALMOLOGY
End: 2023-12-14
Payer: MEDICARE

## 2023-12-14 DIAGNOSIS — H01.112: ICD-10-CM

## 2023-12-14 DIAGNOSIS — H01.114: ICD-10-CM

## 2023-12-14 DIAGNOSIS — H01.111: ICD-10-CM

## 2023-12-14 DIAGNOSIS — H01.115: ICD-10-CM

## 2023-12-14 DIAGNOSIS — H16.223: ICD-10-CM

## 2023-12-14 PROCEDURE — 99213 OFFICE O/P EST LOW 20 MIN: CPT | Performed by: OPHTHALMOLOGY

## 2023-12-14 ASSESSMENT — CORNEAL DYSTROPHY - POSTERIOR
OD_POSTERIORDYSTROPHY: T GUTTATA
OS_POSTERIORDYSTROPHY: T GUTTATA

## 2023-12-14 ASSESSMENT — SUPERFICIAL PUNCTATE KERATITIS (SPK)
OS_SPK: 1+
OD_SPK: 1+

## 2023-12-14 ASSESSMENT — LID EXAM ASSESSMENTS
OD_DERMATOCHALASIS: 1+
OS_DERMATOCHALASIS: 1+
OS_ANGULAR_BLEPHARITIS: 1+
OD_ANGULAR_BLEPHARITIS: 1+

## 2023-12-14 ASSESSMENT — CONFRONTATIONAL VISUAL FIELD TEST (CVF)
OD_FINDINGS: FULL
OS_FINDINGS: FULL

## 2024-01-02 ASSESSMENT — REFRACTION_CURRENTRX
OD_CYLINDER: SPHERE
OS_CYLINDER: SPHERE
OS_OVR_VA: 20/
OD_SPHERE: +2.50
OS_SPHERE: +2.50
OD_OVR_VA: 20/

## 2024-01-02 ASSESSMENT — REFRACTION_MANIFEST
OD_ADD: +2.50
OD_CYLINDER: +0.50
OU_VA: 20/20
OD_VA2: 20/20(J1+)
OS_VA2: 20/20(J1+)
OD_AXIS: 170
OD_VA1: 20/20
OS_SPHERE: PLANO
OS_ADD: +2.50
OD_SPHERE: +0.25
OS_CYLINDER: SPHERE
OS_VA1: 20/20-2

## 2024-01-02 ASSESSMENT — SPHEQUIV_DERIVED: OD_SPHEQUIV: 0.5

## 2024-03-15 ENCOUNTER — OFFICE (OUTPATIENT)
Facility: LOCATION | Age: 74
Setting detail: OPHTHALMOLOGY
End: 2024-03-15
Payer: MEDICARE

## 2024-03-15 DIAGNOSIS — H01.112: ICD-10-CM

## 2024-03-15 DIAGNOSIS — H35.363: ICD-10-CM

## 2024-03-15 DIAGNOSIS — H16.223: ICD-10-CM

## 2024-03-15 DIAGNOSIS — Z13.5: ICD-10-CM

## 2024-03-15 DIAGNOSIS — H01.114: ICD-10-CM

## 2024-03-15 DIAGNOSIS — H52.4: ICD-10-CM

## 2024-03-15 DIAGNOSIS — H31.29: ICD-10-CM

## 2024-03-15 DIAGNOSIS — H18.513: ICD-10-CM

## 2024-03-15 DIAGNOSIS — H01.111: ICD-10-CM

## 2024-03-15 DIAGNOSIS — H01.115: ICD-10-CM

## 2024-03-15 PROBLEM — H43.393 VITREOUS FLOATERS; BOTH EYES: Status: ACTIVE | Noted: 2024-03-15

## 2024-03-15 PROCEDURE — 92014 COMPRE OPH EXAM EST PT 1/>: CPT | Performed by: OPHTHALMOLOGY

## 2024-03-15 PROCEDURE — 92250 FUNDUS PHOTOGRAPHY W/I&R: CPT | Mod: GY | Performed by: OPHTHALMOLOGY

## 2024-03-15 PROCEDURE — 92015 DETERMINE REFRACTIVE STATE: CPT | Performed by: OPHTHALMOLOGY

## 2024-03-15 PROCEDURE — 92015 DETERMINE REFRACTIVE STATE: CPT | Mod: GA | Performed by: OPHTHALMOLOGY

## 2024-03-15 ASSESSMENT — LID EXAM ASSESSMENTS
OD_ANGULAR_BLEPHARITIS: 1+
OS_DERMATOCHALASIS: 1+
OD_DERMATOCHALASIS: 1+
OS_ANGULAR_BLEPHARITIS: 1+

## 2024-03-20 PROBLEM — H02.834 DERMATOCHALASIS; RIGHT UPPER LID, LEFT UPPER LID: Status: ACTIVE | Noted: 2024-03-15

## 2024-03-20 PROBLEM — H31.29 PERIPAPILLARY ATROPHY ; BOTH EYES: Status: ACTIVE | Noted: 2024-03-15

## 2024-03-20 PROBLEM — H02.831 DERMATOCHALASIS; RIGHT UPPER LID, LEFT UPPER LID: Status: ACTIVE | Noted: 2024-03-15

## 2024-03-21 PROBLEM — H35.363 DRUSEN; BOTH EYES: Status: ACTIVE | Noted: 2024-03-15

## 2024-03-21 ASSESSMENT — REFRACTION_CURRENTRX
OD_SPHERE: +2.50
OS_OVR_VA: 20/
OS_OVR_VA: 20/
OD_AXIS: 170
OD_OVR_VA: 20/
OD_CYLINDER: +0.50
OS_CYLINDER: SPHERE
OS_SPHERE: +2.50
OD_OVR_VA: 20/
OS_SPHERE: PLANO
OD_CYLINDER: SPHERE
OD_SPHERE: +0.25
OS_CYLINDER: SPHERE

## 2024-03-21 ASSESSMENT — REFRACTION_TRIALFRAME
OD_AXIS: 010
OS_VA2: 20/20(J1+)
OS_VA1: 20/25+3
OS_SPHERE: PLANO
OS_ADD: +2.50
OD_CYLINDER: +0.75
OD_VA2: 20/20(J1+)
OD_VA1: 20/20
OU_VA: 20/20-2
OD_SPHERE: PLANO
OS_AXIS: 180
OS_CYLINDER: +0.25
OD_ADD: +2.50

## 2024-04-15 ENCOUNTER — NON-APPOINTMENT (OUTPATIENT)
Age: 74
End: 2024-04-15

## 2024-04-15 ENCOUNTER — RESULT REVIEW (OUTPATIENT)
Age: 74
End: 2024-04-15

## 2024-06-07 ENCOUNTER — OFFICE (OUTPATIENT)
Facility: LOCATION | Age: 74
Setting detail: OPHTHALMOLOGY
End: 2024-06-07
Payer: MEDICARE

## 2024-06-07 DIAGNOSIS — H01.115: ICD-10-CM

## 2024-06-07 DIAGNOSIS — H01.111: ICD-10-CM

## 2024-06-07 DIAGNOSIS — H01.112: ICD-10-CM

## 2024-06-07 DIAGNOSIS — H02.834: ICD-10-CM

## 2024-06-07 DIAGNOSIS — H02.831: ICD-10-CM

## 2024-06-07 DIAGNOSIS — H01.114: ICD-10-CM

## 2024-06-07 DIAGNOSIS — H16.223: ICD-10-CM

## 2024-06-07 PROCEDURE — 99213 OFFICE O/P EST LOW 20 MIN: CPT | Performed by: OPHTHALMOLOGY

## 2024-06-07 ASSESSMENT — CONFRONTATIONAL VISUAL FIELD TEST (CVF)
OS_FINDINGS: FULL
OD_FINDINGS: FULL

## 2024-06-07 ASSESSMENT — LID EXAM ASSESSMENTS
OD_ANGULAR_BLEPHARITIS: 1+
OS_ANGULAR_BLEPHARITIS: 1+
OD_DERMATOCHALASIS: 1+
OS_DERMATOCHALASIS: 1+

## 2024-07-30 NOTE — ASU PREOPERATIVE ASSESSMENT, ADULT (IPARK ONLY) - NPO AFTER
Chart reviewed  Last seen: 02/20/2024 annual gyn exam \"Symptoms controlled with Prempro, Vagifem, and Estrace (externally when she remembers)\"  \"> Refilled Vagifem, Prempro and Diflucan.    > Advised to decreased Vagifem 10 mg vaginally twice a week from three times a week to see if that helps with recurrent yeast infections.\"  Last mammo: 06/04/24 BI-RADS Category 2: Benign.   Last pap: 02/20/24 NILM; HPV neg  Contraception/ cycle control used: postmenopausal     02/22/24 estradiol (VAGIFEM) 10 MCG vaginal tablet Sig - Route: Place 1 tablet vaginally 2 days a week. - Vaginal ; #24 tablets with #3 refills sent    Portal msg to pt to contact pharmacy for refills    23:00

## 2024-09-13 ENCOUNTER — OFFICE (OUTPATIENT)
Facility: LOCATION | Age: 74
Setting detail: OPHTHALMOLOGY
End: 2024-09-13
Payer: MEDICARE

## 2024-09-13 DIAGNOSIS — H01.112: ICD-10-CM

## 2024-09-13 DIAGNOSIS — H01.111: ICD-10-CM

## 2024-09-13 DIAGNOSIS — H16.223: ICD-10-CM

## 2024-09-13 DIAGNOSIS — H01.114: ICD-10-CM

## 2024-09-13 DIAGNOSIS — H01.115: ICD-10-CM

## 2024-09-13 PROCEDURE — 99213 OFFICE O/P EST LOW 20 MIN: CPT | Performed by: OPHTHALMOLOGY

## 2024-09-13 ASSESSMENT — LID EXAM ASSESSMENTS
OS_DERMATOCHALASIS: 1+
OD_ANGULAR_BLEPHARITIS: 1+
OS_ANGULAR_BLEPHARITIS: 1+
OD_DERMATOCHALASIS: 1+

## 2024-11-05 ENCOUNTER — APPOINTMENT (OUTPATIENT)
Dept: OBGYN | Facility: CLINIC | Age: 74
End: 2024-11-05

## 2024-11-05 NOTE — ASU PATIENT PROFILE, ADULT - NS TRANSFER HEARING AID
[facility]  Office Visit    Chief Complaint: Follow-up s/p left total knee arthroplasty    HPI:  Olivia Garcia is a 52 y.o. who is here in follow-up of left total knee arthroplasty performed on August 7, 2024.  She has been active and self-directed physical therapy since returning to work 1 month ago.  She does report that her anterior thigh and leg is sore after a long day of work.  Otherwise, her knee continues to improve and she walks without assistive device.    Exam:  Appearance: sitting in exam room chair, appears to be in no acute distress, awake and alert  Resp: unlabored breathing on room air  Skin: warm, dry and intact with out erythema or significant increased temperature  Neuro: grossly intact both lower extremities. Intact sensation to light touch. Motor exam 4+ to 5/5 in all major motor groups.  Left knee: Incision is healed.  Range of motion is 0 to 115 degrees.  Sensation is intact light touch.  There is brisk capillary refill. There is 5/5 muscle strength in all muscle groups.    Imaging:  3 views of the left knee were performed and reviewed today. Significant for total knee arthroplasty prosthesis in place with no signs of osteolysis, loosening, fracture, or dislocation.     Assessment:  S/p left total knee arthroplasty    Plan:  She is recovering well postoperatively.  She will continue working on self-directed physical therapy exercises. We discussed with the patient today the use of antibiotic prophylaxis prior to any dental procedures.  We discussed that the antibiotic prophylaxis would be used to help prevent periprosthetic joint infections.  If they were not offered antibiotics by the physician performing the procedure, they should contact our office that we may prescribe them antibiotics.     Follow-up on an annual basis or sooner if needed.    This dictation was done with [employer] dictation and may contain mechanical errors related to translation.    none

## 2024-12-20 NOTE — H&P PST ADULT - NSICDXFAMILYHX_GEN_ALL_CORE_FT
Spiritual Health History and Assessment/Progress Note  [facility]    Spiritual/Emotional Needs,  ,  ,      Name: Luci Castañeda MRN: [id number]    Age: 60 y.o.     Sex: female   Language: English   Bahai: Episcopal   <principal problem not specified>     Date: 12/20/2024            Total Time Calculated: (P) 10 min              Spiritual Assessment continued in [facility] OR        Referral/Consult From: Rounding   Encounter Overview/Reason: Spiritual/Emotional Needs  Service Provided For: Patient and family together  Writer visited with Patient in the Emergency Department. Pt appeared fatigued and reported having pain. Pt acknowledged that \"it has been a long road.\" Pt was receptive to prayer. Patient and Friend thanked writer.     Karly, Belief, Meaning:   Patient is connected with a karly tradition or spiritual practice and has beliefs or practices that help with coping during difficult times  Family/Friends are connected with a karly tradition or spiritual practice and have beliefs or practices that help with coping during difficult times      Importance and Influence:  Patient has spiritual/personal beliefs that influence decisions regarding their health  Family/Friends have spiritual/personal beliefs that influence decisions regarding the patient's health    Community:  Patient feels well-supported. Support system includes: Parent/s, Karly Community, and Friends  Family/Friends feel well-supported. Support system includes: Karly Community and Friends    Assessment and Plan of Care:   Patient Interventions include: Facilitated expression of thoughts and feelings, Affirmed coping skills/support systems, and Other: offered prayer  Family/Friends Interventions include: Other: Offered words of affirmation and prayer    Patient Plan of Care: Spiritual Care available upon further referral  Family/Friends Plan of Care: Spiritual Care available upon further referral    Electronically signed  by MAMADOU Barrios on 12/20/2024 at 4:04 PM     FAMILY HISTORY:  Family history of Hodgkin's lymphoma, sister  FH: bladder cancer, mother  FH: hypertension, mother  FH: lung cancer, father

## 2025-03-21 ENCOUNTER — OFFICE (OUTPATIENT)
Facility: LOCATION | Age: 75
Setting detail: OPHTHALMOLOGY
End: 2025-03-21
Payer: MEDICARE

## 2025-03-21 DIAGNOSIS — H01.112: ICD-10-CM

## 2025-03-21 DIAGNOSIS — H01.114: ICD-10-CM

## 2025-03-21 DIAGNOSIS — Z13.5: ICD-10-CM

## 2025-03-21 DIAGNOSIS — H52.4: ICD-10-CM

## 2025-03-21 DIAGNOSIS — H01.115: ICD-10-CM

## 2025-03-21 DIAGNOSIS — H35.363: ICD-10-CM

## 2025-03-21 DIAGNOSIS — H01.111: ICD-10-CM

## 2025-03-21 PROBLEM — H10.433 CONJUNCTIVITIS CHRONIC FOLLICULAR; BOTH EYES: Status: ACTIVE | Noted: 2025-03-21

## 2025-03-21 PROCEDURE — 92015 DETERMINE REFRACTIVE STATE: CPT | Mod: GA | Performed by: OPHTHALMOLOGY

## 2025-03-21 PROCEDURE — 92250 FUNDUS PHOTOGRAPHY W/I&R: CPT | Mod: GY | Performed by: OPHTHALMOLOGY

## 2025-03-21 PROCEDURE — 92014 COMPRE OPH EXAM EST PT 1/>: CPT | Performed by: OPHTHALMOLOGY

## 2025-03-21 ASSESSMENT — LID EXAM ASSESSMENTS
OS_ANGULAR_BLEPHARITIS: 1+
OS_DERMATOCHALASIS: 1+
OD_ANGULAR_BLEPHARITIS: 1+
OD_DERMATOCHALASIS: 1+

## 2025-03-21 ASSESSMENT — TONOMETRY
OS_IOP_MMHG: 15
OD_IOP_MMHG: 15

## 2025-03-21 ASSESSMENT — CONFRONTATIONAL VISUAL FIELD TEST (CVF)
OD_FINDINGS: FULL
OS_FINDINGS: FULL

## 2025-03-21 ASSESSMENT — SUPERFICIAL PUNCTATE KERATITIS (SPK)
OD_SPK: 1+
OS_SPK: 1+

## 2025-03-21 ASSESSMENT — CORNEAL DYSTROPHY - POSTERIOR
OD_POSTERIORDYSTROPHY: T GUTTATA
OS_POSTERIORDYSTROPHY: T GUTTATA

## 2025-03-25 PROBLEM — H35.443 AGE-RELATED RETICULAR DEGENERATION OF RETINA; BOTH EYES: Status: ACTIVE | Noted: 2025-03-21

## 2025-03-26 ASSESSMENT — REFRACTION_TRIALFRAME
OD_VA2: 20/20(J1+)
OD_VA1: 20/20
OS_AXIS: 180
OS_VA1: 20/25+3
OD_SPHERE: PLANO
OS_CYLINDER: +0.25
OD_AXIS: 010
OS_ADD: +2.50
OD_ADD: +2.50
OD_CYLINDER: +0.75
OS_VA2: 20/20(J1+)
OS_SPHERE: PLANO
OU_VA: 20/20-2

## 2025-03-26 ASSESSMENT — REFRACTION_AUTOREFRACTION
OS_CYLINDER: SPHERE
OD_AXIS: 172
OD_CYLINDER: +0.25
OS_SPHERE: +0.25
OD_SPHERE: -0.25

## 2025-03-26 ASSESSMENT — KERATOMETRY
OS_AXISANGLE_DEGREES: 086
OS_K1POWER_DIOPTERS: 45.75
OD_K2POWER_DIOPTERS: 45.25
OD_K1POWER_DIOPTERS: 45.25
OS_K2POWER_DIOPTERS: 46.25

## 2025-03-26 ASSESSMENT — REFRACTION_CURRENTRX
OS_SPHERE: PLANO
OS_SPHERE: +2.50
OD_CYLINDER: SPHERE
OD_CYLINDER: +0.50
OS_CYLINDER: SPHERE
OD_OVR_VA: 20/
OD_OVR_VA: 20/
OD_SPHERE: +0.25
OS_CYLINDER: SPHERE
OD_SPHERE: +2.50
OS_OVR_VA: 20/
OD_AXIS: 170
OS_OVR_VA: 20/

## 2025-03-26 ASSESSMENT — REFRACTION_MANIFEST
OD_AXIS: 180
OD_SPHERE: PLANO
OD_CYLINDER: +0.75
OD_ADD: +2.50
OD_VA1: 20/20
OS_CYLINDER: SPHERE
OS_VA1: 20/20
OS_SPHERE: +0.25
OU_VA: 20/20
OS_ADD: +2.50

## 2025-03-26 ASSESSMENT — VISUAL ACUITY
OD_BCVA: 20/20
OS_BCVA: 20/20-2

## 2025-06-27 ENCOUNTER — OFFICE (OUTPATIENT)
Facility: LOCATION | Age: 75
Setting detail: OPHTHALMOLOGY
End: 2025-06-27
Payer: MEDICARE

## 2025-06-27 DIAGNOSIS — H10.433: ICD-10-CM

## 2025-06-27 DIAGNOSIS — H01.112: ICD-10-CM

## 2025-06-27 DIAGNOSIS — H01.114: ICD-10-CM

## 2025-06-27 DIAGNOSIS — H01.111: ICD-10-CM

## 2025-06-27 DIAGNOSIS — H01.115: ICD-10-CM

## 2025-06-27 PROBLEM — H01.005 BLEPHARITIS; RIGHT UPPER LID, RIGHT LOWER LID, LEFT UPPER LID, LEFT LOWER LID: Status: ACTIVE | Noted: 2025-06-27

## 2025-06-27 PROBLEM — H01.001 BLEPHARITIS; RIGHT UPPER LID, RIGHT LOWER LID, LEFT UPPER LID, LEFT LOWER LID: Status: ACTIVE | Noted: 2025-06-27

## 2025-06-27 PROBLEM — H01.004 BLEPHARITIS; RIGHT UPPER LID, RIGHT LOWER LID, LEFT UPPER LID, LEFT LOWER LID: Status: ACTIVE | Noted: 2025-06-27

## 2025-06-27 PROBLEM — H02.83 DERMATOCHALSIS ; BOTH EYES: Status: ACTIVE | Noted: 2025-06-27

## 2025-06-27 PROBLEM — H01.002 BLEPHARITIS; RIGHT UPPER LID, RIGHT LOWER LID, LEFT UPPER LID, LEFT LOWER LID: Status: ACTIVE | Noted: 2025-06-27

## 2025-06-27 PROCEDURE — 99213 OFFICE O/P EST LOW 20 MIN: CPT | Performed by: OPHTHALMOLOGY

## 2025-06-27 ASSESSMENT — REFRACTION_TRIALFRAME
OD_ADD: +2.50
OU_VA: 20/20-2
OS_SPHERE: PLANO
OS_AXIS: 180
OS_ADD: +2.50
OD_CYLINDER: +0.75
OD_SPHERE: PLANO
OD_AXIS: 010
OS_VA1: 20/25+3
OD_VA2: 20/20(J1+)
OS_CYLINDER: +0.25
OD_VA1: 20/20
OS_VA2: 20/20(J1+)

## 2025-06-27 ASSESSMENT — REFRACTION_CURRENTRX
OD_CYLINDER: +0.50
OS_OVR_VA: 20/
OS_SPHERE: +2.50
OS_CYLINDER: SPHERE
OS_SPHERE: PLANO
OD_OVR_VA: 20/
OS_CYLINDER: SPHERE
OS_OVR_VA: 20/
OD_OVR_VA: 20/
OD_AXIS: 170
OD_SPHERE: +0.25
OD_SPHERE: +2.50
OD_CYLINDER: SPHERE

## 2025-06-27 ASSESSMENT — REFRACTION_AUTOREFRACTION
OS_SPHERE: +0.25
OD_SPHERE: -0.25
OD_CYLINDER: +0.25
OS_CYLINDER: SPHERE
OD_AXIS: 172

## 2025-06-27 ASSESSMENT — REFRACTION_MANIFEST
OS_VA1: 20/20
OS_SPHERE: +0.25
OD_AXIS: 180
OD_VA1: 20/20
OD_SPHERE: PLANO
OS_CYLINDER: SPHERE
OD_CYLINDER: +0.75
OD_ADD: +2.50
OU_VA: 20/20
OS_ADD: +2.50

## 2025-06-27 ASSESSMENT — KERATOMETRY
OD_K1POWER_DIOPTERS: 45.25
OS_K1POWER_DIOPTERS: 45.75
OD_K2POWER_DIOPTERS: 45.25
OS_K2POWER_DIOPTERS: 46.25
OS_AXISANGLE_DEGREES: 086

## 2025-06-27 ASSESSMENT — SUPERFICIAL PUNCTATE KERATITIS (SPK)
OD_SPK: 1+
OS_SPK: 1+

## 2025-06-27 ASSESSMENT — LID EXAM ASSESSMENTS
OD_ANGULAR_BLEPHARITIS: 1+
OS_ANGULAR_BLEPHARITIS: 1+
OS_DERMATOCHALASIS: 1+
OD_DERMATOCHALASIS: 1+

## 2025-06-27 ASSESSMENT — TONOMETRY
OS_IOP_MMHG: 14
OD_IOP_MMHG: 14

## 2025-06-27 ASSESSMENT — VISUAL ACUITY
OD_BCVA: 20/25
OS_BCVA: 20/25-

## 2025-06-27 ASSESSMENT — CORNEAL DYSTROPHY - POSTERIOR
OD_POSTERIORDYSTROPHY: T GUTTATA
OS_POSTERIORDYSTROPHY: T GUTTATA

## 2025-06-27 ASSESSMENT — CONFRONTATIONAL VISUAL FIELD TEST (CVF)
OS_FINDINGS: FULL
OD_FINDINGS: FULL

## (undated) DEVICE — LABELS BLANK W PEN

## (undated) DEVICE — DRAPE SPLIT SHEET 77" X 120"

## (undated) DEVICE — SOL IRR POUR H2O 500ML

## (undated) DEVICE — SOL IRR BAG NS 0.9% 1000ML

## (undated) DEVICE — DRAPE FLUID WARMER 44 X 44"

## (undated) DEVICE — SYR LUER LOK 10CC

## (undated) DEVICE — VENODYNE/SCD SLEEVE CALF MEDIUM

## (undated) DEVICE — SLING SHOULDER IMMOBILIZER CLINIC MEDIUM

## (undated) DEVICE — SUT SILK 3-0 30" RB-1

## (undated) DEVICE — BIPOLAR FORCEP KIRWAN JEWELERS STR 4" X 0.4MM W 12FT CORD (GREEN)

## (undated) DEVICE — CATH IV SAFE BC 20G X 1.16" (PINK)

## (undated) DEVICE — TAPE SILK 3"

## (undated) DEVICE — VESSEL LOOP BATRIK MAXI YELLOW

## (undated) DEVICE — MEDTRONIC CATHETER PASSER 38CM

## (undated) DEVICE — PACK HEAD & NECK

## (undated) DEVICE — ELCTR BIPOLAR STIMULATOR PROBE SIDE-BY-SIDE

## (undated) DEVICE — SUT VICRYL 4-0 27" RB-1 UNDYED

## (undated) DEVICE — DRSG TEGADERM 2.5X3"

## (undated) DEVICE — DRAPE CAMERA ENDOMATE

## (undated) DEVICE — PRASS PAIRED EMG ELECT 18MM

## (undated) DEVICE — ELCTR GROUNDING PAD ADULT COVIDIEN

## (undated) DEVICE — DRAPE C ARM BAND BAG

## (undated) DEVICE — VESSEL LOOP ASPEN MINI BLUE

## (undated) DEVICE — MARKING PEN W RULER

## (undated) DEVICE — DRSG CURITY GAUZE SPONGE 4 X 4" 12-PLY

## (undated) DEVICE — DRSG TELFA 3 X 8

## (undated) DEVICE — DRAPE IOBAN 23" X 23"

## (undated) DEVICE — SUT SILK 2-0 30" SH

## (undated) DEVICE — DRSG MASTISOL

## (undated) DEVICE — SUT SILK 3-0 18" TIES

## (undated) DEVICE — CANISTER DISPOSABLE THIN WALL 3000CC

## (undated) DEVICE — POSITIONER FOAM EGG CRATE ULNAR 2PCS (PINK)

## (undated) DEVICE — SPONGE PEANUT AUTO COUNT

## (undated) DEVICE — DRAPE TOWEL BLUE STICKY

## (undated) DEVICE — SUT MONOCRYL 4-0 27" PS-2 UNDYED

## (undated) DEVICE — DRAPE 3/4 SHEET 52X76"

## (undated) DEVICE — STAPLER SKIN VISI-STAT 35 WIDE